# Patient Record
Sex: MALE | Race: WHITE | NOT HISPANIC OR LATINO | Employment: FULL TIME | URBAN - METROPOLITAN AREA
[De-identification: names, ages, dates, MRNs, and addresses within clinical notes are randomized per-mention and may not be internally consistent; named-entity substitution may affect disease eponyms.]

---

## 2017-06-06 ENCOUNTER — OFFICE VISIT (OUTPATIENT)
Dept: FAMILY MEDICINE CLINIC | Facility: CLINIC | Age: 57
End: 2017-06-06

## 2017-06-06 VITALS
DIASTOLIC BLOOD PRESSURE: 74 MMHG | WEIGHT: 182 LBS | HEART RATE: 71 BPM | RESPIRATION RATE: 16 BRPM | HEIGHT: 67 IN | SYSTOLIC BLOOD PRESSURE: 110 MMHG | TEMPERATURE: 98.6 F | BODY MASS INDEX: 28.56 KG/M2 | OXYGEN SATURATION: 97 %

## 2017-06-06 DIAGNOSIS — R06.83 SNORING: ICD-10-CM

## 2017-06-06 DIAGNOSIS — Z86.73 HX-TIA (TRANSIENT ISCHEMIC ATTACK): ICD-10-CM

## 2017-06-06 DIAGNOSIS — Z87.19 HX OF HEMORRHOIDS: ICD-10-CM

## 2017-06-06 DIAGNOSIS — Z00.00 LABORATORY EXAMINATION ORDERED AS PART OF A ROUTINE GENERAL MEDICAL EXAMINATION: ICD-10-CM

## 2017-06-06 DIAGNOSIS — L98.9 SKIN LESION: ICD-10-CM

## 2017-06-06 DIAGNOSIS — J20.9 ACUTE BRONCHITIS DUE TO INFECTION: ICD-10-CM

## 2017-06-06 DIAGNOSIS — J01.90 ACUTE SINUSITIS, RECURRENCE NOT SPECIFIED, UNSPECIFIED LOCATION: Primary | ICD-10-CM

## 2017-06-06 PROBLEM — Z85.038 HISTORY OF COLON CANCER: Status: ACTIVE | Noted: 2017-06-06

## 2017-06-06 PROBLEM — Z86.010 PERSONAL HISTORY OF COLONIC POLYPS: Status: ACTIVE | Noted: 2017-06-06

## 2017-06-06 PROCEDURE — 99214 OFFICE O/P EST MOD 30 MIN: CPT | Performed by: PHYSICIAN ASSISTANT

## 2017-06-06 RX ORDER — AMOXICILLIN AND CLAVULANATE POTASSIUM 875; 125 MG/1; MG/1
1 TABLET, FILM COATED ORAL 2 TIMES DAILY
Qty: 20 TABLET | Refills: 1 | Status: SHIPPED | OUTPATIENT
Start: 2017-06-06 | End: 2018-11-05

## 2017-06-06 RX ORDER — PREDNISONE 20 MG/1
20 TABLET ORAL 2 TIMES DAILY
Qty: 10 TABLET | Refills: 0 | Status: SHIPPED | OUTPATIENT
Start: 2017-06-06 | End: 2018-11-05

## 2017-06-06 RX ORDER — ASPIRIN 81 MG/1
81 TABLET ORAL DAILY
COMMUNITY

## 2017-06-06 NOTE — PROGRESS NOTES
Subjective   Ethan Almonte is a 56 y.o. male.     History of Present Illness   Ethan Almonte 56 y.o. male who presents for evaluation of sinus pressure and congestion, acute bronchitis, sore throat, cough, ear pressure, wheezing, shortness of air. Symptoms include sneezing, nasal blockage, post nasal drip, productive cough, runny nose and wheezing is worse at night.  He does smoke.  Onset of symptoms was 2 weeks ago, gradually worsening since that time. Patient denies diarrhea.   Evaluation to date: none Treatment to date:  OTC oral decongestants.     I need to update labs.  Need this fasting one month after pred  Hx TIA  Normal bilat carotid doppler 8-12-16  Interpretation Summary echo  · Left ventricular function is normal. Calculated EF = 59.2%. Estimated EF was in agreement with the calculated EF. Normal left ventricular cavity size and wall thickness noted. All left ventricular wall segments contract normally. Left ventricular diastolic dysfunction is noted (grade I) consistent with impaired relaxation.           IMPRESSION:  Normal MRI of the brain without contrast except for a small  polyp in the right maxillary sinus.      This report was finalized on 8/4/2016 10:38 PM by Dr. Jin Mancia MD.        I will refill his hemorrhoid cream;  Send it to me    Next colonoscopy Dr Phelan Jan 2018 for abn adenomatous polyps 1-2015    The following portions of the patient's history were reviewed and updated as appropriate: allergies, current medications, past family history, past medical history, past social history, past surgical history and problem list.    Review of Systems   Constitutional: Positive for diaphoresis, fatigue and fever. Negative for activity change and unexpected weight change.   HENT: Positive for congestion, postnasal drip, rhinorrhea, sinus pressure, sneezing, sore throat and voice change. Negative for ear pain.    Eyes: Negative for pain and discharge.   Respiratory: Positive for cough, shortness of  breath and wheezing. Negative for chest tightness.    Cardiovascular: Negative for chest pain and palpitations.   Gastrointestinal: Negative for abdominal pain, diarrhea and vomiting.   Endocrine: Negative.    Genitourinary: Negative.    Musculoskeletal: Negative for joint swelling.   Skin: Negative for color change, rash and wound.   Allergic/Immunologic: Negative.    Neurological: Negative for seizures and syncope.   Psychiatric/Behavioral: Negative.        Objective   Physical Exam   Constitutional: He is oriented to person, place, and time. He appears well-developed and well-nourished.  Non-toxic appearance. No distress.   HENT:   Head: Normocephalic and atraumatic. Hair is normal.   Right Ear: External ear normal. No drainage, swelling or tenderness. Tympanic membrane is retracted.   Left Ear: External ear normal. No drainage, swelling or tenderness. Tympanic membrane is retracted.   Nose: Mucosal edema present. No epistaxis.   Mouth/Throat: Uvula is midline and mucous membranes are normal. No oral lesions. No uvula swelling. Posterior oropharyngeal erythema present. No oropharyngeal exudate.   Eyes: Conjunctivae and EOM are normal. Pupils are equal, round, and reactive to light. Right eye exhibits no discharge. Left eye exhibits no discharge. No scleral icterus.   Neck: Normal range of motion. Neck supple.   Cardiovascular: Normal rate, regular rhythm and normal heart sounds.  Exam reveals no gallop.    No murmur heard.  Pulmonary/Chest: No stridor. No respiratory distress. He has wheezes. He has no rales. He exhibits no tenderness.   Abdominal: Soft. There is no tenderness.   Lymphadenopathy:     He has cervical adenopathy.   Neurological: He is alert and oriented to person, place, and time. He exhibits normal muscle tone.   Skin: Skin is warm and dry. No rash noted. He is not diaphoretic.   Pearly pea size lesion left temple and needs derm >?basal cell?   Psychiatric: He has a normal mood and affect. His  behavior is normal. Judgment and thought content normal.   Nursing note and vitals reviewed.      Assessment/Plan   Problems Addressed this Visit        Other    Hx-TIA (transient ischemic attack)    Relevant Orders    Comprehensive metabolic panel    Lipid panel    CBC and Differential    TSH    PSA    T4, Free    Vitamin D 25 Hydroxy    Ambulatory Referral to Sleep Medicine    Ambulatory Referral to Dermatology    Hx of hemorrhoids    Relevant Orders    Comprehensive metabolic panel    Lipid panel    CBC and Differential    TSH    PSA    T4, Free    Vitamin D 25 Hydroxy    Ambulatory Referral to Sleep Medicine    Ambulatory Referral to Dermatology      Other Visit Diagnoses     Acute sinusitis, recurrence not specified, unspecified location    -  Primary    Relevant Orders    Comprehensive metabolic panel    Lipid panel    CBC and Differential    TSH    PSA    T4, Free    Vitamin D 25 Hydroxy    Ambulatory Referral to Sleep Medicine    Ambulatory Referral to Dermatology    Acute bronchitis due to infection        Relevant Medications    amoxicillin-clavulanate (AUGMENTIN) 875-125 MG per tablet    Other Relevant Orders    Comprehensive metabolic panel    Lipid panel    CBC and Differential    TSH    PSA    T4, Free    Vitamin D 25 Hydroxy    Ambulatory Referral to Sleep Medicine    Ambulatory Referral to Dermatology    Laboratory examination ordered as part of a routine general medical examination        Relevant Orders    Comprehensive metabolic panel    Lipid panel    CBC and Differential    TSH    PSA    T4, Free    Vitamin D 25 Hydroxy    Ambulatory Referral to Sleep Medicine    Ambulatory Referral to Dermatology    Skin lesion        Relevant Orders    Comprehensive metabolic panel    Lipid panel    CBC and Differential    TSH    PSA    T4, Free    Vitamin D 25 Hydroxy    Ambulatory Referral to Sleep Medicine    Ambulatory Referral to Dermatology    Snoring        Relevant Orders    Comprehensive metabolic  panel    Lipid panel    CBC and Differential    TSH    PSA    T4, Free    Vitamin D 25 Hydroxy    Ambulatory Referral to Sleep Medicine    Ambulatory Referral to Dermatology

## 2017-06-06 NOTE — PATIENT INSTRUCTIONS
Low glycemic index diet  Exercise 30 minutes most days of the week  Make sure you get results on any labs or tests we ordered today  We discussed medications and how to take them as prescribed  Sleep 6-8 hours each night if possible  If you have not signed up for [employer], please activate your code ASAP from your After Visit Summary today    LDL goal <100  LDL goal if heart disease <70  HDL goal >60  Triglyceride goal <150  BP goal =<130/80  Fasting glucose <100    See derm  Sleep study  Labs one month  Stop smoking

## 2018-07-31 ENCOUNTER — PREP FOR SURGERY (OUTPATIENT)
Dept: OTHER | Facility: HOSPITAL | Age: 58
End: 2018-07-31

## 2018-07-31 DIAGNOSIS — Z86.010 HISTORY OF COLON POLYPS: Primary | ICD-10-CM

## 2018-11-07 ENCOUNTER — ANESTHESIA EVENT (OUTPATIENT)
Dept: GASTROENTEROLOGY | Facility: HOSPITAL | Age: 58
End: 2018-11-07

## 2018-11-07 ENCOUNTER — ANESTHESIA (OUTPATIENT)
Dept: GASTROENTEROLOGY | Facility: HOSPITAL | Age: 58
End: 2018-11-07

## 2018-11-07 ENCOUNTER — HOSPITAL ENCOUNTER (OUTPATIENT)
Facility: HOSPITAL | Age: 58
Setting detail: HOSPITAL OUTPATIENT SURGERY
Discharge: HOME | End: 2018-11-07
Attending: COLON & RECTAL SURGERY | Admitting: COLON & RECTAL SURGERY

## 2018-11-07 VITALS
HEART RATE: 62 BPM | BODY MASS INDEX: 26.6 KG/M2 | OXYGEN SATURATION: 96 % | HEIGHT: 67 IN | SYSTOLIC BLOOD PRESSURE: 105 MMHG | TEMPERATURE: 98.2 F | WEIGHT: 169.44 LBS | RESPIRATION RATE: 16 BRPM | DIASTOLIC BLOOD PRESSURE: 71 MMHG

## 2018-11-07 DIAGNOSIS — Z86.010 HISTORY OF COLON POLYPS: ICD-10-CM

## 2018-11-07 PROCEDURE — 88305 TISSUE EXAM BY PATHOLOGIST: CPT | Performed by: COLON & RECTAL SURGERY

## 2018-11-07 PROCEDURE — 25010000002 PROPOFOL 10 MG/ML EMULSION: Performed by: ANESTHESIOLOGY

## 2018-11-07 PROCEDURE — 45380 COLONOSCOPY AND BIOPSY: CPT | Performed by: COLON & RECTAL SURGERY

## 2018-11-07 RX ORDER — PROPOFOL 10 MG/ML
VIAL (ML) INTRAVENOUS CONTINUOUS PRN
Status: DISCONTINUED | OUTPATIENT
Start: 2018-11-07 | End: 2018-11-07 | Stop reason: SURG

## 2018-11-07 RX ORDER — SODIUM CHLORIDE, SODIUM LACTATE, POTASSIUM CHLORIDE, CALCIUM CHLORIDE 600; 310; 30; 20 MG/100ML; MG/100ML; MG/100ML; MG/100ML
1000 INJECTION, SOLUTION INTRAVENOUS CONTINUOUS
Status: DISCONTINUED | OUTPATIENT
Start: 2018-11-07 | End: 2018-11-07 | Stop reason: HOSPADM

## 2018-11-07 RX ORDER — LIDOCAINE HYDROCHLORIDE 20 MG/ML
INJECTION, SOLUTION INFILTRATION; PERINEURAL AS NEEDED
Status: DISCONTINUED | OUTPATIENT
Start: 2018-11-07 | End: 2018-11-07 | Stop reason: SURG

## 2018-11-07 RX ORDER — DOCUSATE SODIUM 100 MG/1
100 CAPSULE, LIQUID FILLED ORAL DAILY
COMMUNITY

## 2018-11-07 RX ORDER — SODIUM CHLORIDE 0.9 % (FLUSH) 0.9 %
3 SYRINGE (ML) INJECTION AS NEEDED
Status: DISCONTINUED | OUTPATIENT
Start: 2018-11-07 | End: 2018-11-07 | Stop reason: HOSPADM

## 2018-11-07 RX ORDER — PROPOFOL 10 MG/ML
VIAL (ML) INTRAVENOUS AS NEEDED
Status: DISCONTINUED | OUTPATIENT
Start: 2018-11-07 | End: 2018-11-07 | Stop reason: SURG

## 2018-11-07 RX ORDER — LIDOCAINE HYDROCHLORIDE 10 MG/ML
0.5 INJECTION, SOLUTION INFILTRATION; PERINEURAL ONCE AS NEEDED
Status: DISCONTINUED | OUTPATIENT
Start: 2018-11-07 | End: 2018-11-07 | Stop reason: HOSPADM

## 2018-11-07 RX ADMIN — SODIUM CHLORIDE, POTASSIUM CHLORIDE, SODIUM LACTATE AND CALCIUM CHLORIDE 1000 ML: 600; 310; 30; 20 INJECTION, SOLUTION INTRAVENOUS at 07:21

## 2018-11-07 RX ADMIN — SODIUM CHLORIDE, POTASSIUM CHLORIDE, SODIUM LACTATE AND CALCIUM CHLORIDE: 600; 310; 30; 20 INJECTION, SOLUTION INTRAVENOUS at 07:27

## 2018-11-07 RX ADMIN — PROPOFOL 140 MCG/KG/MIN: 10 INJECTION, EMULSION INTRAVENOUS at 07:29

## 2018-11-07 RX ADMIN — LIDOCAINE HYDROCHLORIDE 40 MG: 20 INJECTION, SOLUTION INFILTRATION; PERINEURAL at 07:27

## 2018-11-07 RX ADMIN — PROPOFOL 50 MG: 10 INJECTION, EMULSION INTRAVENOUS at 07:29

## 2018-11-07 NOTE — ANESTHESIA POSTPROCEDURE EVALUATION
"Patient: Ethan Almonte    Procedure Summary     Date:  11/07/18 Room / Location:  [facility] ENDOSCOPY 9 /  [facility]    Anesthesia Start:  0725 Anesthesia Stop:  0747    Procedure:  COLONOSCOPY INTO CECUM WITH BIOPSIES AND POLYPECTOMY X 2 (N/A ) Diagnosis:       History of colon polyps      (History of colon polyps [Z86.010])    Surgeon:  Spencer Phelan MD Provider:  Jameel Castanon MD    Anesthesia Type:  MAC ASA Status:  3          Anesthesia Type: MAC  Last vitals  BP   115/73 (11/07/18 0710)   Temp   36.8 °C (98.2 °F) (11/07/18 0710)   Pulse   64 (11/07/18 0710)   Resp   16 (11/07/18 0710)     SpO2   96 % (11/07/18 0710)     Post Anesthesia Care and Evaluation    Patient location during evaluation: bedside  Patient participation: complete - patient participated  Level of consciousness: awake  Pain score: 2  Pain management: adequate  Airway patency: patent  Anesthetic complications: No anesthetic complications  PONV Status: none  Cardiovascular status: acceptable  Respiratory status: acceptable  Hydration status: acceptable    Comments: /73 (BP Location: Left arm, Patient Position: Sitting)   Pulse 64   Temp 36.8 °C (98.2 °F) (Oral)   Resp 16   Ht 170.2 cm (67\")   Wt 76.9 kg (169 lb 7 oz)   SpO2 96%   BMI 26.54 kg/m²         "

## 2018-11-07 NOTE — ANESTHESIA PREPROCEDURE EVALUATION
Anesthesia Evaluation     Patient summary reviewed and Nursing notes reviewed   NPO Solid Status: > 8 hours  NPO Liquid Status: > 2 hours           Airway   Mallampati: II  TM distance: >3 FB  Neck ROM: full  Dental - normal exam     Pulmonary - normal exam   (+) pneumonia resolved , a smoker Current Smoked day of surgery,   Cardiovascular - negative cardio ROS and normal exam        Neuro/Psych  (+) CVA,     GI/Hepatic/Renal/Endo - negative ROS     Musculoskeletal (-) negative ROS    Abdominal    Substance History - negative use     OB/GYN negative ob/gyn ROS         Other                        Anesthesia Plan    ASA 3     MAC     Anesthetic plan, all risks, benefits, and alternatives have been provided, discussed and informed consent has been obtained with: patient.

## 2018-11-07 NOTE — H&P
Ethan Almotne is a 57 y.o. male  who is referred by Jakub Phelan MD for a colonoscopy. He is an  has a history of previous adenomatous polyp.     He denies any change in bowel function, melena, or hematochezia.    Past Medical History:   Diagnosis Date   • Actinic keratosis    • Acute right eye pain 08/04/2016    WITH VISUAL DISTURBANCE, SEEN AT [facility] ER   • BPH (benign prostatic hyperplasia)    • Colon polyps    • Hemorrhoids    • Mass of anus 10/15/2013    PATH: FIBRO-EPITHELIAL POLYP WITH CONDYLOMATOUS CHANGE, HPV TESTING NEGATIVE   • Pneumonia    • Postoperative urinary retention 03/06/2006    ADMITTED TO [facility]   • Seborrheic keratosis    • Snoring    • Stroke (CMS/HCC)     TIA       Past Surgical History:   Procedure Laterality Date   • ANUS SURGERY N/A 10/15/2013    EXCISION OF ANAL MASS, DR. JAKUB PHELAN AT [facility]   • COLONOSCOPY N/A 05/14/2014    5 MM SESSILE HYPERPLASTIC POLYP IN TRANSVERSE, 7 MM SESSILE ADENOMATOUS POLYP IN DESCENDING, 12 MM SESSILE ADENOMATOUS POLYP IN SIGMOID, 4 MM SESSILE HYPERPLASTIC POLYP IN RECTUM,RESCOPE IN 6 MONTH, DR. JAKUB PHELAN AT [facility]   • COLONOSCOPY N/A 01/14/2015    TATTOO SEEN IN PROXIMAL SIGMOID COLON, RESCOPE IN 3 YRS, DR. JAKUB PHELAN AT [facility]   • CRYOTHERAPY N/A 07/28/2017    RIGHT HAND, BILATERAL FOREHEAD, LEFT CHEEK, [facility] PASCUAL PA-C   • HEMORRHOIDECTOMY N/A 03/02/2006    INTERNAL HEMORRHOIDS WITH THROMBOSIS AND PROLAPSE, DR. JULIAN CAMARGO AT [facility]   • SKIN BIOPSY Left 07/28/2017    LEFT TEMPLE LESION, [facility] PASCUAL PA-C   • STRABISMUS SURGERY         Prescriptions Prior to Admission   Medication Sig Dispense Refill Last Dose   • docusate sodium (COLACE) 100 MG capsule Take 100 mg by mouth Daily.   10/31/2018   • aspirin 81 MG EC tablet Take 81 mg by mouth Daily.   11/3/2018       No Known Allergies    History reviewed. No pertinent family history.    Social History     Social History   • Marital status:      Spouse name: N/A   • Number of children: N/A   • Years of  education: N/A     Occupational History   • Not on file.     Social History Main Topics   • Smoking status: Current Every Day Smoker     Packs/day: 1.00     Types: Cigarettes   • Smokeless tobacco: Never Used   • Alcohol use Yes      Comment: RARELY   • Drug use: No   • Sexual activity: Defer     Other Topics Concern   • Not on file     Social History Narrative   • No narrative on file       Review of Systems   Gastrointestinal: Negative for abdominal pain, nausea and vomiting.   All other systems reviewed and are negative.      Vitals:    11/07/18 0710   BP: 115/73   Pulse: 64   Resp: 16   Temp: 98.2 °F (36.8 °C)   SpO2: 96%         Physical Exam   Constitutional: He is oriented to person, place, and time. He appears well-developed and well-nourished.   HENT:   Head: Normocephalic and atraumatic.   Eyes: Pupils are equal, round, and reactive to light. EOM are normal.   Cardiovascular: Regular rhythm.    Pulmonary/Chest: Effort normal.   Abdominal: Soft. He exhibits no distension.   Musculoskeletal: Normal range of motion.   Neurological: He is alert and oriented to person, place, and time.   Skin: Skin is warm and dry.   Psychiatric: Judgment and thought content normal.         Assessment/Plan      previous adenomatous polyp.         I recommend colonoscopy.  I described risks, benefits of the procedure with the patient including but not limited to bleeding, infection, possibility of perforation and possible polypectomy. All of the patient's questions were answered and they would like to proceed with the above recommendations.

## 2018-11-08 LAB
CYTO UR: NORMAL
LAB AP CASE REPORT: NORMAL
PATH REPORT.FINAL DX SPEC: NORMAL
PATH REPORT.GROSS SPEC: NORMAL

## 2019-01-09 ENCOUNTER — OFFICE VISIT (OUTPATIENT)
Dept: FAMILY MEDICINE CLINIC | Facility: CLINIC | Age: 59
End: 2019-01-09

## 2019-01-09 VITALS
DIASTOLIC BLOOD PRESSURE: 68 MMHG | TEMPERATURE: 97.8 F | OXYGEN SATURATION: 97 % | RESPIRATION RATE: 16 BRPM | WEIGHT: 175 LBS | HEIGHT: 67 IN | SYSTOLIC BLOOD PRESSURE: 114 MMHG | BODY MASS INDEX: 27.47 KG/M2 | HEART RATE: 62 BPM

## 2019-01-09 DIAGNOSIS — Z12.5 SCREENING PSA (PROSTATE SPECIFIC ANTIGEN): ICD-10-CM

## 2019-01-09 DIAGNOSIS — Z87.81 HISTORY OF WRIST FRACTURE: ICD-10-CM

## 2019-01-09 DIAGNOSIS — Z00.00 LABORATORY EXAMINATION ORDERED AS PART OF A ROUTINE GENERAL MEDICAL EXAMINATION: ICD-10-CM

## 2019-01-09 DIAGNOSIS — Z86.73 HX-TIA (TRANSIENT ISCHEMIC ATTACK): Primary | ICD-10-CM

## 2019-01-09 PROCEDURE — 99396 PREV VISIT EST AGE 40-64: CPT | Performed by: PHYSICIAN ASSISTANT

## 2019-01-09 NOTE — PROGRESS NOTES
Subjective   Ethan Almonte is a 58 y.o. male.     History of Present Illness       Notes from last visit  June 2017!!!!!!    Hx TIA  Normal bilat carotid doppler 8-12-16  Interpretation Summary echo  · Left ventricular function is normal. Calculated EF = 59.2%. Estimated EF was in agreement with the calculated EF. Normal left ventricular cavity size and wall thickness noted. All left ventricular wall segments contract normally. Left ventricular diastolic dysfunction is noted (grade I) consistent with impaired relaxation.             IMPRESSION:  Normal MRI of the brain without contrast except for a small  polyp in the right maxillary sinus.      This report was finalized on 8/4/2016 10:38 PM by Dr. Jin Mancia MD.         Next colonoscopy Dr Phelan ---do again 6mos    Right wrist prior fx; still weak in this wrist and needs to have note to use crossbow for [employer] form  He is right handed and can't pull bow string back and he deer hunts    The following portions of the patient's history were reviewed and updated as appropriate: allergies, current medications, past family history, past medical history, past social history, past surgical history and problem list.    Review of Systems   Constitutional: Negative for activity change, appetite change and unexpected weight change.   HENT: Negative for nosebleeds and trouble swallowing.    Eyes: Negative for pain and visual disturbance.   Respiratory: Negative for chest tightness, shortness of breath and wheezing.    Cardiovascular: Negative for chest pain and palpitations.   Gastrointestinal: Negative for abdominal pain and blood in stool.   Endocrine: Negative.    Genitourinary: Negative for difficulty urinating and hematuria.   Musculoskeletal: Negative for joint swelling.   Skin: Negative for color change and rash.   Allergic/Immunologic: Negative.    Neurological: Negative for syncope and speech difficulty.   Hematological: Negative for adenopathy.    Psychiatric/Behavioral: Negative for agitation and confusion.   All other systems reviewed and are negative.      Objective   Physical Exam   Constitutional: He is oriented to person, place, and time. He appears well-developed and well-nourished.  Non-toxic appearance. No distress.   HENT:   Head: Normocephalic and atraumatic. Hair is normal.   Right Ear: External ear normal. No drainage, swelling or tenderness. Tympanic membrane is retracted.   Left Ear: External ear normal. No drainage, swelling or tenderness. Tympanic membrane is retracted.   Nose: Mucosal edema present. No epistaxis.   Mouth/Throat: Uvula is midline and mucous membranes are normal. No oral lesions. No uvula swelling. Posterior oropharyngeal erythema present. No oropharyngeal exudate.   Eyes: Conjunctivae and EOM are normal. Pupils are equal, round, and reactive to light. Right eye exhibits no discharge. Left eye exhibits no discharge. No scleral icterus.   Neck: Normal range of motion. Neck supple.   Cardiovascular: Normal rate, regular rhythm and normal heart sounds. Exam reveals no gallop.   No murmur heard.  Pulmonary/Chest: No stridor. No respiratory distress. He has no wheezes. He has no rales. He exhibits no tenderness.   Abdominal: Soft. There is no tenderness.   Musculoskeletal:   He is sore with right wrist ROM and reduced ROM; small ganglion cyst dorsal wrist--declines hand doc referral   Lymphadenopathy:     He has no cervical adenopathy.   Neurological: He is alert and oriented to person, place, and time. He exhibits normal muscle tone.   Skin: Skin is warm and dry. No rash noted. He is not diaphoretic.   Pearly pea size lesion left temple and needs derm >?basal cell?   Psychiatric: He has a normal mood and affect. His behavior is normal. Judgment and thought content normal.   Nursing note and vitals reviewed.      Assessment/Plan   Ethan was seen today for forms.    Diagnoses and all orders for this visit:    Hx-TIA (transient  ischemic attack)  -     Comprehensive metabolic panel  -     Lipid panel  -     CBC and Differential  -     TSH  -     Vitamin D 25 Hydroxy  -     Vitamin B12  -     Folate  -     Urinalysis With Microscopic - Urine, Clean Catch  -     PSA Screen  -     T3, Free  -     T4, Free    Screening PSA (prostate specific antigen)  -     Comprehensive metabolic panel  -     Lipid panel  -     CBC and Differential  -     TSH  -     Vitamin D 25 Hydroxy  -     Vitamin B12  -     Folate  -     Urinalysis With Microscopic - Urine, Clean Catch  -     PSA Screen  -     T3, Free  -     T4, Free    Laboratory examination ordered as part of a routine general medical examination  -     Comprehensive metabolic panel  -     Lipid panel  -     CBC and Differential  -     TSH  -     Vitamin D 25 Hydroxy  -     Vitamin B12  -     Folate  -     Urinalysis With Microscopic - Urine, Clean Catch  -     PSA Screen  -     T3, Free  -     T4, Free    History of wrist fracture

## 2019-01-10 DIAGNOSIS — R71.8 ELEVATED MCV: ICD-10-CM

## 2019-01-10 DIAGNOSIS — R73.01 IMPAIRED FASTING GLUCOSE: ICD-10-CM

## 2019-01-10 DIAGNOSIS — E78.2 MIXED HYPERLIPIDEMIA: Primary | ICD-10-CM

## 2019-01-10 LAB
25(OH)D3+25(OH)D2 SERPL-MCNC: 33.4 NG/ML (ref 30–100)
ALBUMIN SERPL-MCNC: 4.4 G/DL (ref 3.5–5.2)
ALBUMIN/GLOB SERPL: 1.6 G/DL
ALP SERPL-CCNC: 58 U/L (ref 39–117)
ALT SERPL-CCNC: 17 U/L (ref 1–41)
APPEARANCE UR: CLEAR
AST SERPL-CCNC: 14 U/L (ref 1–40)
BACTERIA #/AREA URNS HPF: NORMAL /HPF
BASOPHILS # BLD AUTO: 0.02 10*3/MM3 (ref 0–0.2)
BASOPHILS NFR BLD AUTO: 0.3 % (ref 0–1.5)
BILIRUB SERPL-MCNC: 0.2 MG/DL (ref 0.1–1.2)
BILIRUB UR QL STRIP: NEGATIVE
BUN SERPL-MCNC: 10 MG/DL (ref 6–20)
BUN/CREAT SERPL: 8 (ref 7–25)
CALCIUM SERPL-MCNC: 9.5 MG/DL (ref 8.6–10.5)
CASTS URNS MICRO: NORMAL
CHLORIDE SERPL-SCNC: 103 MMOL/L (ref 98–107)
CHOLEST SERPL-MCNC: 163 MG/DL (ref 0–200)
CO2 SERPL-SCNC: 24.4 MMOL/L (ref 22–29)
COLOR UR: YELLOW
CREAT SERPL-MCNC: 1.25 MG/DL (ref 0.76–1.27)
EOSINOPHIL # BLD AUTO: 0.17 10*3/MM3 (ref 0–0.7)
EOSINOPHIL NFR BLD AUTO: 2.6 % (ref 0.3–6.2)
EPI CELLS #/AREA URNS HPF: NORMAL /HPF
ERYTHROCYTE [DISTWIDTH] IN BLOOD BY AUTOMATED COUNT: 13.3 % (ref 11.5–14.5)
FOLATE SERPL-MCNC: 8.31 NG/ML (ref 4.78–24.2)
GLOBULIN SER CALC-MCNC: 2.7 GM/DL
GLUCOSE SERPL-MCNC: 104 MG/DL (ref 65–99)
GLUCOSE UR QL: NEGATIVE
HCT VFR BLD AUTO: 48.8 % (ref 40.4–52.2)
HDLC SERPL-MCNC: 35 MG/DL (ref 40–60)
HGB BLD-MCNC: 15.8 G/DL (ref 13.7–17.6)
HGB UR QL STRIP: NEGATIVE
IMM GRANULOCYTES # BLD AUTO: 0.02 10*3/MM3 (ref 0–0.03)
IMM GRANULOCYTES NFR BLD AUTO: 0.3 % (ref 0–0.5)
KETONES UR QL STRIP: NEGATIVE
LDLC SERPL CALC-MCNC: 102 MG/DL (ref 0–100)
LEUKOCYTE ESTERASE UR QL STRIP: NEGATIVE
LYMPHOCYTES # BLD AUTO: 2.15 10*3/MM3 (ref 0.9–4.8)
LYMPHOCYTES NFR BLD AUTO: 33.4 % (ref 19.6–45.3)
MCH RBC QN AUTO: 33.1 PG (ref 27–32.7)
MCHC RBC AUTO-ENTMCNC: 32.4 G/DL (ref 32.6–36.4)
MCV RBC AUTO: 102.1 FL (ref 79.8–96.2)
MONOCYTES # BLD AUTO: 0.53 10*3/MM3 (ref 0.2–1.2)
MONOCYTES NFR BLD AUTO: 8.2 % (ref 5–12)
NEUTROPHILS # BLD AUTO: 3.54 10*3/MM3 (ref 1.9–8.1)
NEUTROPHILS NFR BLD AUTO: 55.2 % (ref 42.7–76)
NITRITE UR QL STRIP: NEGATIVE
PH UR STRIP: 6 [PH] (ref 5–8)
PLATELET # BLD AUTO: 228 10*3/MM3 (ref 140–500)
POTASSIUM SERPL-SCNC: 4.7 MMOL/L (ref 3.5–5.2)
PROT SERPL-MCNC: 7.1 G/DL (ref 6–8.5)
PROT UR QL STRIP: NEGATIVE
PSA SERPL-MCNC: 0.61 NG/ML (ref 0–4)
RBC # BLD AUTO: 4.78 10*6/MM3 (ref 4.6–6)
RBC #/AREA URNS HPF: NORMAL /HPF
SODIUM SERPL-SCNC: 141 MMOL/L (ref 136–145)
SP GR UR: 1.01 (ref 1–1.03)
T3FREE SERPL-MCNC: 3 PG/ML (ref 2–4.4)
T4 FREE SERPL-MCNC: 1.21 NG/DL (ref 0.93–1.7)
TRIGL SERPL-MCNC: 129 MG/DL (ref 0–150)
TSH SERPL DL<=0.005 MIU/L-ACNC: 1.88 MIU/ML (ref 0.27–4.2)
UROBILINOGEN UR STRIP-MCNC: (no result) MG/DL
VIT B12 SERPL-MCNC: 364 PG/ML (ref 211–946)
VLDLC SERPL CALC-MCNC: 25.8 MG/DL (ref 5–40)
WBC # BLD AUTO: 6.43 10*3/MM3 (ref 4.5–10.7)
WBC #/AREA URNS HPF: NORMAL /HPF

## 2019-01-11 LAB
HBA1C MFR BLD: 5.52 % (ref 4.8–5.6)
Lab: NORMAL
WRITTEN AUTHORIZATION: NORMAL

## 2019-01-14 RX ORDER — ATORVASTATIN CALCIUM 20 MG/1
20 TABLET, FILM COATED ORAL DAILY
Qty: 30 TABLET | Refills: 11 | Status: SHIPPED | OUTPATIENT
Start: 2019-01-14

## 2019-01-14 NOTE — PROGRESS NOTES
Pt states he will start the medication for cholesterol but you did not send it over. Will get his labs checked in 3 months

## 2019-02-21 ENCOUNTER — RESULTS ENCOUNTER (OUTPATIENT)
Dept: FAMILY MEDICINE CLINIC | Facility: CLINIC | Age: 59
End: 2019-02-21

## 2019-02-21 DIAGNOSIS — R71.8 ELEVATED MCV: ICD-10-CM

## 2019-02-21 DIAGNOSIS — E78.2 MIXED HYPERLIPIDEMIA: ICD-10-CM

## 2019-02-21 DIAGNOSIS — R73.01 IMPAIRED FASTING GLUCOSE: ICD-10-CM

## 2019-05-04 ENCOUNTER — HOSPITAL ENCOUNTER (EMERGENCY)
Facility: HOSPITAL | Age: 59
Discharge: HOME | End: 2019-05-04
Attending: EMERGENCY MEDICINE | Admitting: EMERGENCY MEDICINE

## 2019-05-04 ENCOUNTER — APPOINTMENT (OUTPATIENT)
Dept: GENERAL RADIOLOGY | Facility: HOSPITAL | Age: 59
End: 2019-05-04

## 2019-05-04 VITALS
SYSTOLIC BLOOD PRESSURE: 102 MMHG | HEART RATE: 73 BPM | BODY MASS INDEX: 27.47 KG/M2 | HEIGHT: 67 IN | OXYGEN SATURATION: 96 % | WEIGHT: 175 LBS | TEMPERATURE: 97.6 F | RESPIRATION RATE: 18 BRPM | DIASTOLIC BLOOD PRESSURE: 54 MMHG

## 2019-05-04 DIAGNOSIS — S22.31XA CLOSED FRACTURE OF ONE RIB OF RIGHT SIDE, INITIAL ENCOUNTER: Primary | ICD-10-CM

## 2019-05-04 LAB
ALBUMIN SERPL-MCNC: 4.2 G/DL (ref 3.5–5.2)
ALBUMIN/GLOB SERPL: 1.2 G/DL
ALP SERPL-CCNC: 75 U/L (ref 39–117)
ALT SERPL W P-5'-P-CCNC: 21 U/L (ref 1–41)
ANION GAP SERPL CALCULATED.3IONS-SCNC: 12.5 MMOL/L
AST SERPL-CCNC: 17 U/L (ref 1–40)
BASOPHILS # BLD AUTO: 0.03 10*3/MM3 (ref 0–0.2)
BASOPHILS NFR BLD AUTO: 0.3 % (ref 0–1.5)
BILIRUB SERPL-MCNC: 0.7 MG/DL (ref 0.2–1.2)
BUN BLD-MCNC: 14 MG/DL (ref 6–20)
BUN/CREAT SERPL: 11.7 (ref 7–25)
CALCIUM SPEC-SCNC: 8.9 MG/DL (ref 8.6–10.5)
CHLORIDE SERPL-SCNC: 96 MMOL/L (ref 98–107)
CO2 SERPL-SCNC: 23.5 MMOL/L (ref 22–29)
CREAT BLD-MCNC: 1.2 MG/DL (ref 0.76–1.27)
DEPRECATED RDW RBC AUTO: 46.5 FL (ref 37–54)
EOSINOPHIL # BLD AUTO: 0.23 10*3/MM3 (ref 0–0.4)
EOSINOPHIL NFR BLD AUTO: 2 % (ref 0.3–6.2)
ERYTHROCYTE [DISTWIDTH] IN BLOOD BY AUTOMATED COUNT: 12.9 % (ref 12.3–15.4)
GFR SERPL CREATININE-BSD FRML MDRD: 63 ML/MIN/1.73
GLOBULIN UR ELPH-MCNC: 3.6 GM/DL
GLUCOSE BLD-MCNC: 103 MG/DL (ref 65–99)
HCT VFR BLD AUTO: 48 % (ref 37.5–51)
HGB BLD-MCNC: 16.2 G/DL (ref 13–17.7)
HOLD SPECIMEN: NORMAL
HOLD SPECIMEN: NORMAL
IMM GRANULOCYTES # BLD AUTO: 0.04 10*3/MM3 (ref 0–0.05)
IMM GRANULOCYTES NFR BLD AUTO: 0.3 % (ref 0–0.5)
LYMPHOCYTES # BLD AUTO: 1.62 10*3/MM3 (ref 0.7–3.1)
LYMPHOCYTES NFR BLD AUTO: 13.8 % (ref 19.6–45.3)
MCH RBC QN AUTO: 32.9 PG (ref 26.6–33)
MCHC RBC AUTO-ENTMCNC: 33.8 G/DL (ref 31.5–35.7)
MCV RBC AUTO: 97.4 FL (ref 79–97)
MONOCYTES # BLD AUTO: 1.05 10*3/MM3 (ref 0.1–0.9)
MONOCYTES NFR BLD AUTO: 9 % (ref 5–12)
NEUTROPHILS # BLD AUTO: 8.74 10*3/MM3 (ref 1.7–7)
NEUTROPHILS NFR BLD AUTO: 74.6 % (ref 42.7–76)
NRBC BLD AUTO-RTO: 0 /100 WBC (ref 0–0.2)
PLATELET # BLD AUTO: 316 10*3/MM3 (ref 140–450)
PMV BLD AUTO: 10.4 FL (ref 6–12)
POTASSIUM BLD-SCNC: 4.2 MMOL/L (ref 3.5–5.2)
PROT SERPL-MCNC: 7.8 G/DL (ref 6–8.5)
RBC # BLD AUTO: 4.93 10*6/MM3 (ref 4.14–5.8)
SODIUM BLD-SCNC: 132 MMOL/L (ref 136–145)
TROPONIN T SERPL-MCNC: <0.01 NG/ML (ref 0–0.03)
WBC NRBC COR # BLD: 11.71 10*3/MM3 (ref 3.4–10.8)
WHOLE BLOOD HOLD SPECIMEN: NORMAL
WHOLE BLOOD HOLD SPECIMEN: NORMAL

## 2019-05-04 PROCEDURE — 93010 ELECTROCARDIOGRAM REPORT: CPT | Performed by: INTERNAL MEDICINE

## 2019-05-04 PROCEDURE — 93005 ELECTROCARDIOGRAM TRACING: CPT | Performed by: NURSE PRACTITIONER

## 2019-05-04 PROCEDURE — 99284 EMERGENCY DEPT VISIT MOD MDM: CPT

## 2019-05-04 PROCEDURE — 71101 X-RAY EXAM UNILAT RIBS/CHEST: CPT

## 2019-05-04 PROCEDURE — 84484 ASSAY OF TROPONIN QUANT: CPT | Performed by: NURSE PRACTITIONER

## 2019-05-04 PROCEDURE — 80053 COMPREHEN METABOLIC PANEL: CPT | Performed by: NURSE PRACTITIONER

## 2019-05-04 PROCEDURE — 85025 COMPLETE CBC W/AUTO DIFF WBC: CPT | Performed by: NURSE PRACTITIONER

## 2019-05-04 RX ORDER — HYDROCODONE BITARTRATE AND ACETAMINOPHEN 5; 325 MG/1; MG/1
1 TABLET ORAL EVERY 4 HOURS PRN
Qty: 12 TABLET | Refills: 0 | Status: SHIPPED | OUTPATIENT
Start: 2019-05-04

## 2019-05-04 RX ORDER — HYDROCODONE BITARTRATE AND ACETAMINOPHEN 7.5; 325 MG/1; MG/1
1 TABLET ORAL ONCE
Status: COMPLETED | OUTPATIENT
Start: 2019-05-04 | End: 2019-05-04

## 2019-05-04 RX ORDER — LIDOCAINE 50 MG/G
1 PATCH TOPICAL EVERY 24 HOURS
Qty: 30 EACH | Refills: 0 | Status: SHIPPED | OUTPATIENT
Start: 2019-05-04

## 2019-05-04 RX ADMIN — HYDROCODONE BITARTRATE AND ACETAMINOPHEN 1 TABLET: 7.5; 325 TABLET ORAL at 12:56

## 2019-05-04 NOTE — ED PROVIDER NOTES
MD ATTESTATION NOTE    The LEANNE and I have discussed this patient's history, physical exam, and treatment plan.  I have reviewed the documentation and personally had a face to face interaction with the patient. I affirm the documentation and agree with the treatment and plan.  The attached note describes my personal findings.      History  58-year-old with atypical chest pain going on for several days.  The pain is right-sided and worse with movement or palpation, very atypical for cardiac.  Wife is concerned for possible gallbladder disease.  I did discuss case and evaluation with DEION Dee    Physical Exam  Alert, no apparent distress  Heart regular rate and rhythm  Is clear to auscultation bilaterally  Chest there is point tenderness to palpation in the lower right chest wall    EKG          EKG time: 1307  Rhythm/Rate: Sinus 78  P waves and IN: Normal P waves and IN intervals  QRS, axis: Normal axis normal QRS  ST and T waves: Normal ST and T wave    Interpreted Contemporaneously by me, independently viewed  No prior to compare      Disposition  History and exam is very atypical for cardiac, but we will go ahead and check x-ray and labs to rule out other pathologies including lung chest wall and abdominal pathologies.       Jin Ramírez MD  05/04/19 1309       Jin Ramírez MD  05/04/19 [id number]

## 2019-05-04 NOTE — ED PROVIDER NOTES
"  EMERGENCY DEPARTMENT ENCOUNTER    CHIEF COMPLAINT  Chief Complaint: Right rib pain  History given by:Pt  History limited by:None  Time Seen: 12:40 PM   Room Number: 31/31  PMD: Adrienne Preston PA-C      HPI:  Pt is a 58 y.o. male who presents with worsening intermittent sharp right sided rib pain that began 2 days ago. Pt states the pain is aggravated by movement and deep breath and alleviated by holding pressure on his ribs.  Patient also complains of an episode of indigestion pain that kept him awake 3 nights ago. Pt states he is \"95% sure it was heartburn pain\". Patient denies fever, cardiac CP, SOA, abd pain, and N/V/D. Pt denies past cardiac hx. He also denies h/o DM, HTN, and lung disease. Pt has taken Advil for pain PTA.   Past Medical History of HLD and TIA.    Duration: 3 days  Timing:intermittent   Location:rigth ribs  Quality:sharp  Intensity/Severity:moderate  Progression:worsening  Associated Symptoms:indigestion   Aggravating Factors:movement, deep breath  Alleviating Factors:holding pressure on ribs  Previous Episodes:none  Treatment before arrival:Pt has taken Advil for pain PTA.       PAST MEDICAL HISTORY  Active Ambulatory Problems     Diagnosis Date Noted   • Hx-TIA (transient ischemic attack) 08/12/2016   • Hx of hemorrhoids 06/06/2017   • Personal history of colonic polyps 06/06/2017   • History of colon polyps 07/31/2018   • History of wrist fracture 01/09/2019   • Mixed hyperlipidemia 01/10/2019   • Impaired fasting glucose 01/10/2019   • Elevated MCV 01/10/2019     Resolved Ambulatory Problems     Diagnosis Date Noted   • No Resolved Ambulatory Problems     Past Medical History:   Diagnosis Date   • Actinic keratosis    • Acute right eye pain 08/04/2016   • BPH (benign prostatic hyperplasia)    • Colon polyps    • Hemorrhoids    • Mass of anus 10/15/2013   • Pneumonia    • Postoperative urinary retention 03/06/2006   • Seborrheic keratosis    • Snoring    • Stroke (CMS/HCC)        PAST " SURGICAL HISTORY  Past Surgical History:   Procedure Laterality Date   • ANUS SURGERY N/A 10/15/2013    EXCISION OF ANAL MASS, DR. JAKUB LEVI AT [facility]   • COLONOSCOPY N/A 05/14/2014    5 MM SESSILE HYPERPLASTIC POLYP IN TRANSVERSE, 7 MM SESSILE ADENOMATOUS POLYP IN DESCENDING, 12 MM SESSILE ADENOMATOUS POLYP IN SIGMOID, 4 MM SESSILE HYPERPLASTIC POLYP IN RECTUM,RESCOPE IN 6 MONTH, DR. JAKUB LEVI AT [facility]   • COLONOSCOPY N/A 01/14/2015    TATTOO SEEN IN PROXIMAL SIGMOID COLON, RESCOPE IN 3 YRS, DR. JAKUB LEVI AT [facility]   • COLONOSCOPY N/A 11/7/2018    3 HYPERPLASTIC POLYPS, RESCOPE IN 5 YRS, DR. JAKUB LEVI AT [facility]   • CRYOTHERAPY N/A 07/28/2017    RIGHT HAND, BILATERAL FOREHEAD, LEFT CHEEK, BRENTON PASCUAL PA-C   • HEMORRHOIDECTOMY N/A 03/02/2006    INTERNAL HEMORRHOIDS WITH THROMBOSIS AND PROLAPSE, DR. JULIAN CAMARGO AT [facility]   • SKIN BIOPSY Left 07/28/2017    LEFT TEMPLE LESION, BRENTON PASCUAL PA-C   • STRABISMUS SURGERY         FAMILY HISTORY  History reviewed. No pertinent family history.    SOCIAL HISTORY  Social History     Socioeconomic History   • Marital status:      Spouse name: Not on file   • Number of children: Not on file   • Years of education: Not on file   • Highest education level: Not on file   Tobacco Use   • Smoking status: Current Every Day Smoker     Packs/day: 1.00     Types: Cigarettes   • Smokeless tobacco: Never Used   Substance and Sexual Activity   • Alcohol use: Yes     Comment: RARELY   • Drug use: No   • Sexual activity: Defer         ALLERGIES  Patient has no known allergies.    REVIEW OF SYSTEMS  Review of Systems   Constitutional: Negative for chills and fever.   HENT: Negative for sore throat.    Respiratory: Negative for shortness of breath.    Cardiovascular: Negative for chest pain.   Gastrointestinal: Negative for abdominal pain, diarrhea, nausea and vomiting.        Indigestion (+)   Genitourinary: Negative for dysuria.   Musculoskeletal: Negative for back pain.        Right  sided rib pain (+)   Skin: Negative for rash.   Neurological: Negative for dizziness.   Psychiatric/Behavioral: The patient is not nervous/anxious.        PHYSICAL EXAM  ED Triage Vitals   Temp Heart Rate Resp BP SpO2   05/04/19 1153 05/04/19 1153 05/04/19 1212 05/04/19 1208 05/04/19 1153   97.6 °F (36.4 °C) 104 18 108/71 96 %         Physical Exam   Constitutional: He is well-developed, well-nourished, and in no distress. No distress.   HENT:   Head: Atraumatic.   Mouth/Throat: Mucous membranes are normal.   Eyes: No scleral icterus.   Neck: Normal range of motion.   Cardiovascular: Normal rate, regular rhythm and normal heart sounds.   Pulmonary/Chest: Effort normal and breath sounds normal.   reproducible right lateral chest wall pain   Abdominal: Soft. There is no tenderness.   Musculoskeletal: Normal range of motion.   Neurological: He is alert.   Skin: Skin is warm and dry.   Psychiatric: Mood and affect normal.   Nursing note and vitals reviewed.      LAB RESULTS  Recent Results (from the past 24 hour(s))   Light Blue Top    Collection Time: 05/04/19 12:20 PM   Result Value Ref Range    Extra Tube hold for add-on    Green Top (Gel)    Collection Time: 05/04/19 12:20 PM   Result Value Ref Range    Extra Tube Hold for add-ons.    Lavender Top    Collection Time: 05/04/19 12:20 PM   Result Value Ref Range    Extra Tube hold for add-on    Gold Top - SST    Collection Time: 05/04/19 12:20 PM   Result Value Ref Range    Extra Tube Hold for add-ons.    Comprehensive Metabolic Panel    Collection Time: 05/04/19 12:20 PM   Result Value Ref Range    Glucose 103 (H) 65 - 99 mg/dL    BUN 14 6 - 20 mg/dL    Creatinine 1.20 0.76 - 1.27 mg/dL    Sodium 132 (L) 136 - 145 mmol/L    Potassium 4.2 3.5 - 5.2 mmol/L    Chloride 96 (L) 98 - 107 mmol/L    CO2 23.5 22.0 - 29.0 mmol/L    Calcium 8.9 8.6 - 10.5 mg/dL    Total Protein 7.8 6.0 - 8.5 g/dL    Albumin 4.20 3.50 - 5.20 g/dL    ALT (SGPT) 21 1 - 41 U/L    AST (SGOT) 17 1 -  40 U/L    Alkaline Phosphatase 75 39 - 117 U/L    Total Bilirubin 0.7 0.2 - 1.2 mg/dL    eGFR Non African Amer 63 >60 mL/min/1.73    Globulin 3.6 gm/dL    A/G Ratio 1.2 g/dL    BUN/Creatinine Ratio 11.7 7.0 - 25.0    Anion Gap 12.5 mmol/L   Troponin    Collection Time: 05/04/19 12:20 PM   Result Value Ref Range    Troponin T <0.010 0.000 - 0.030 ng/mL   CBC Auto Differential    Collection Time: 05/04/19 12:20 PM   Result Value Ref Range    WBC 11.71 (H) 3.40 - 10.80 10*3/mm3    RBC 4.93 4.14 - 5.80 10*6/mm3    Hemoglobin 16.2 13.0 - 17.7 g/dL    Hematocrit 48.0 37.5 - 51.0 %    MCV 97.4 (H) 79.0 - 97.0 fL    MCH 32.9 26.6 - 33.0 pg    MCHC 33.8 31.5 - 35.7 g/dL    RDW 12.9 12.3 - 15.4 %    RDW-SD 46.5 37.0 - 54.0 fl    MPV 10.4 6.0 - 12.0 fL    Platelets 316 140 - 450 10*3/mm3    Neutrophil % 74.6 42.7 - 76.0 %    Lymphocyte % 13.8 (L) 19.6 - 45.3 %    Monocyte % 9.0 5.0 - 12.0 %    Eosinophil % 2.0 0.3 - 6.2 %    Basophil % 0.3 0.0 - 1.5 %    Immature Grans % 0.3 0.0 - 0.5 %    Neutrophils, Absolute 8.74 (H) 1.70 - 7.00 10*3/mm3    Lymphocytes, Absolute 1.62 0.70 - 3.10 10*3/mm3    Monocytes, Absolute 1.05 (H) 0.10 - 0.90 10*3/mm3    Eosinophils, Absolute 0.23 0.00 - 0.40 10*3/mm3    Basophils, Absolute 0.03 0.00 - 0.20 10*3/mm3    Immature Grans, Absolute 0.04 0.00 - 0.05 10*3/mm3    nRBC 0.0 0.0 - 0.2 /100 WBC       I ordered the above labs and reviewed the results    RADIOLOGY  XR Ribs Right With PA Chest   The lungs are well-expanded and clear except for a few  calcified granulomas scattered in the right lower lobe and calcified  right hilar lymph nodes. The heart size is normal.     Multiple views of the right ribs shows slight irregularity at the  costochondral junction of the right 8th rib and potentially representing  acute fracture at this location          I ordered the above noted radiological studies and reviewed the images on the PACS system.          EKG    ekg was interpreted by Dr. Ramírez, see   Darrell note for interpretation.    PROGRESS AND CONSULTS  12:19 PM  EKG ordered for evaluation.     12:52 PM  Labs and XR right ribs ordered. Norco ordered for pain.     12:57 PM  Reviewed pt's history and workup with Dr. Ramírez.  At bedside evaluation, they agree with the plan of care.    2:33 PM  Rechecked pt who is resting in NAD. Informed pt of normal labs and EKG. Discussed XR results shows a right eight rib fracture. Pt will be discharged with pain medication and incentive spirometry. Pt understands and agrees with the plan, all questions answered.    Reviewed implications of results, diagnosis, meds, responsibility to follow up, warning signs and symptoms of possible worsening, potential complications and reasons to return to ER with patient.  Discussed all results and noted any abnormalities with patient.  Discussed absolute need to recheck abnormalities with PCP    Discussed plan for discharge, as there is no emergent indication for admission.  Pt is agreeable and understands need for follow up and repeat testing.  Pt is aware that discharge does not mean that nothing is wrong but it indicates no emergency is present.  Pt is discharged with instructions to follow up with primary care doctor to have their blood pressure rechecked.       DIAGNOSIS  Final diagnoses:   Closed fracture of one rib of right side, initial encounter       FOLLOW UP   Adrienne Preston, PANandoC  [address] [facility].[address] [place] [address]  [phone]    In 2 days        RX     Medication List      New Prescriptions    HYDROcodone-acetaminophen 5-325 MG per tablet  Commonly known as:  NORCO  Take 1 tablet by mouth Every 4 (Four) Hours As Needed for Moderate Pain .     lidocaine 5 %  Commonly known as:  LIDODERM  Place 1 patch on the skin as directed by provider Daily. Remove & Discard   patch within 12 hours or as directed by MD Kim report [id number] reviewed.  Risks, benefits, alternatives discussed with patient.  Pt  "consents to treatment and agrees to follow up with PMD tomorrow for further care and any other prescriptions.           COURSE & MEDICAL DECISION MAKING  Pertinent Labs and Imaging studies that were ordered and reviewed are noted above.  Results were reviewed/discussed with the patient and they were also made aware of online assess.   Pt also made aware that some labs, such as cultures, will not be resulted during ER visit and follow up with PMD is necessary.     MEDICATIONS GIVEN IN ER  Medications   HYDROcodone-acetaminophen (NORCO) 7.5-325 MG per tablet 1 tablet (1 tablet Oral Given 5/4/19 1256)       /69   Pulse 75   Temp 97.6 °F (36.4 °C) (Tympanic)   Resp 18   Ht 170.2 cm (67\")   Wt 79.4 kg (175 lb)   SpO2 96%   BMI 27.41 kg/m²       I personally reviewed the past medical history, past surgical history, social history, family history, current medications and allergies as they appear in this chart.  The scribe's note accurately reflects the work and decisions made by me.     Documentation assistance provided by milo Gibson for MK Bob on 5/4/2019 at 2:25 PM. Information recorded by the scribe was done at my direction and has been verified and validated by me.        Neisha Gibson  05/04/19 1503       Shelia Dee APRN  05/04/19 1618    "

## 2019-05-04 NOTE — DISCHARGE INSTRUCTIONS
Medications as ordered  Use Incentive Spirometry every 2 hours while awake  Follow up with pmd in 5-7 days for recheck  Return to er for fever, chills, cough, shortness of air, chest pain, or any new or worsening symptoms

## 2020-07-09 ENCOUNTER — APPOINTMENT (OUTPATIENT)
Dept: MRI IMAGING | Facility: HOSPITAL | Age: 60
End: 2020-07-09

## 2020-07-09 ENCOUNTER — HOSPITAL ENCOUNTER (EMERGENCY)
Facility: HOSPITAL | Age: 60
Discharge: HOME | End: 2020-07-09
Attending: EMERGENCY MEDICINE | Admitting: EMERGENCY MEDICINE

## 2020-07-09 VITALS
TEMPERATURE: 97.8 F | WEIGHT: 180 LBS | RESPIRATION RATE: 18 BRPM | OXYGEN SATURATION: 94 % | DIASTOLIC BLOOD PRESSURE: 69 MMHG | BODY MASS INDEX: 28.25 KG/M2 | HEART RATE: 61 BPM | SYSTOLIC BLOOD PRESSURE: 124 MMHG | HEIGHT: 67 IN

## 2020-07-09 DIAGNOSIS — S39.012A LOW BACK STRAIN, INITIAL ENCOUNTER: Primary | ICD-10-CM

## 2020-07-09 LAB
ANION GAP SERPL CALCULATED.3IONS-SCNC: 8.7 MMOL/L (ref 5–15)
BASOPHILS # BLD AUTO: 0.02 10*3/MM3 (ref 0–0.2)
BASOPHILS NFR BLD AUTO: 0.3 % (ref 0–1.5)
BUN SERPL-MCNC: 9 MG/DL (ref 6–20)
BUN/CREAT SERPL: 8.7 (ref 7–25)
CALCIUM SPEC-SCNC: 9.7 MG/DL (ref 8.6–10.5)
CHLORIDE SERPL-SCNC: 105 MMOL/L (ref 98–107)
CO2 SERPL-SCNC: 24.3 MMOL/L (ref 22–29)
CREAT SERPL-MCNC: 1.03 MG/DL (ref 0.76–1.27)
DEPRECATED RDW RBC AUTO: 43.1 FL (ref 37–54)
EOSINOPHIL # BLD AUTO: 0.14 10*3/MM3 (ref 0–0.4)
EOSINOPHIL NFR BLD AUTO: 1.8 % (ref 0.3–6.2)
ERYTHROCYTE [DISTWIDTH] IN BLOOD BY AUTOMATED COUNT: 12.7 % (ref 12.3–15.4)
GFR SERPL CREATININE-BSD FRML MDRD: 74 ML/MIN/1.73
GLUCOSE SERPL-MCNC: 89 MG/DL (ref 65–99)
HCT VFR BLD AUTO: 48 % (ref 37.5–51)
HGB BLD-MCNC: 17.1 G/DL (ref 13–17.7)
IMM GRANULOCYTES # BLD AUTO: 0.03 10*3/MM3 (ref 0–0.05)
IMM GRANULOCYTES NFR BLD AUTO: 0.4 % (ref 0–0.5)
LYMPHOCYTES # BLD AUTO: 1.93 10*3/MM3 (ref 0.7–3.1)
LYMPHOCYTES NFR BLD AUTO: 24.5 % (ref 19.6–45.3)
MCH RBC QN AUTO: 33.2 PG (ref 26.6–33)
MCHC RBC AUTO-ENTMCNC: 35.6 G/DL (ref 31.5–35.7)
MCV RBC AUTO: 93.2 FL (ref 79–97)
MONOCYTES # BLD AUTO: 0.52 10*3/MM3 (ref 0.1–0.9)
MONOCYTES NFR BLD AUTO: 6.6 % (ref 5–12)
NEUTROPHILS NFR BLD AUTO: 5.23 10*3/MM3 (ref 1.7–7)
NEUTROPHILS NFR BLD AUTO: 66.4 % (ref 42.7–76)
NRBC BLD AUTO-RTO: 0 /100 WBC (ref 0–0.2)
PLATELET # BLD AUTO: 218 10*3/MM3 (ref 140–450)
PMV BLD AUTO: 10.1 FL (ref 6–12)
POTASSIUM SERPL-SCNC: 3.9 MMOL/L (ref 3.5–5.2)
RBC # BLD AUTO: 5.15 10*6/MM3 (ref 4.14–5.8)
SODIUM SERPL-SCNC: 138 MMOL/L (ref 136–145)
WBC # BLD AUTO: 7.87 10*3/MM3 (ref 3.4–10.8)

## 2020-07-09 PROCEDURE — 25010000002 KETOROLAC TROMETHAMINE PER 15 MG: Performed by: EMERGENCY MEDICINE

## 2020-07-09 PROCEDURE — 25010000002 MORPHINE PER 10 MG: Performed by: EMERGENCY MEDICINE

## 2020-07-09 PROCEDURE — 85025 COMPLETE CBC W/AUTO DIFF WBC: CPT | Performed by: EMERGENCY MEDICINE

## 2020-07-09 PROCEDURE — 99284 EMERGENCY DEPT VISIT MOD MDM: CPT

## 2020-07-09 PROCEDURE — 96375 TX/PRO/DX INJ NEW DRUG ADDON: CPT

## 2020-07-09 PROCEDURE — 80048 BASIC METABOLIC PNL TOTAL CA: CPT | Performed by: EMERGENCY MEDICINE

## 2020-07-09 PROCEDURE — 96374 THER/PROPH/DIAG INJ IV PUSH: CPT

## 2020-07-09 PROCEDURE — 72148 MRI LUMBAR SPINE W/O DYE: CPT

## 2020-07-09 RX ORDER — MORPHINE SULFATE 2 MG/ML
4 INJECTION, SOLUTION INTRAMUSCULAR; INTRAVENOUS ONCE
Status: COMPLETED | OUTPATIENT
Start: 2020-07-09 | End: 2020-07-09

## 2020-07-09 RX ORDER — KETOROLAC TROMETHAMINE 15 MG/ML
15 INJECTION, SOLUTION INTRAMUSCULAR; INTRAVENOUS ONCE
Status: COMPLETED | OUTPATIENT
Start: 2020-07-09 | End: 2020-07-09

## 2020-07-09 RX ORDER — CYCLOBENZAPRINE HCL 5 MG
5 TABLET ORAL 3 TIMES DAILY PRN
Qty: 15 TABLET | Refills: 0 | Status: SHIPPED | OUTPATIENT
Start: 2020-07-09

## 2020-07-09 RX ORDER — SODIUM CHLORIDE 0.9 % (FLUSH) 0.9 %
10 SYRINGE (ML) INJECTION AS NEEDED
Status: DISCONTINUED | OUTPATIENT
Start: 2020-07-09 | End: 2020-07-09 | Stop reason: HOSPADM

## 2020-07-09 RX ORDER — NAPROXEN SODIUM 550 MG/1
550 TABLET ORAL 2 TIMES DAILY WITH MEALS
Qty: 10 TABLET | Refills: 0 | Status: SHIPPED | OUTPATIENT
Start: 2020-07-09

## 2020-07-09 RX ADMIN — MORPHINE SULFATE 4 MG: 2 INJECTION, SOLUTION INTRAMUSCULAR; INTRAVENOUS at 13:47

## 2020-07-09 RX ADMIN — KETOROLAC TROMETHAMINE 15 MG: 15 INJECTION, SOLUTION INTRAMUSCULAR; INTRAVENOUS at 16:21

## 2020-07-09 RX ADMIN — SODIUM CHLORIDE, PRESERVATIVE FREE 10 ML: 5 INJECTION INTRAVENOUS at 13:50

## 2020-07-09 NOTE — ED TRIAGE NOTES
Pt c/o lower back pain that started last night at work. Pt was lifting something heavy at onset.     Pt wearing mask on arrival. Staff wearing mask and goggles at triage

## 2020-07-09 NOTE — DISCHARGE INSTRUCTIONS
Frequent heating pads with light stretching and increased range of motion as tolerated over the next couple of days, take medications as prescribed, gradual return to normal activity as tolerated, follow-up with primary care provider as needed, follow-up with neurosurgery group as needed, return to the emergency department for worsening symptoms as needed.

## 2020-07-09 NOTE — ED PROVIDER NOTES
EMERGENCY DEPARTMENT ENCOUNTER    Room Number:  27/27  Date of encounter:  7/9/2020  PCP: Adrienne Preston, KITTY  Historian: Patient      HPI:  Chief Complaint: Acute back pain  A complete HPI/ROS/PMH/PSH/SH/FH are unobtainable due to: None    Context: Ethan Almonte is a 59 y.o. male who presents to the ED via private vehicle from home with acute severe back pain in the lower back that goes across both sides but does not radiate into the abdomen or legs happened last night when he tried to  piece of metal equipment last night around 5PM at least 50 pounds heavy.  Patient states that he went home as he has had this happen to him before about once a year and typically will improve on its own, but has not had any improvement.  States that he can manage to use a walker at home to get to the bathroom but requires the support of his family to do so due to the severe pain.  Denies any focal numbness or weakness to the extremities, denies any saddle anesthesia or urinary or fecal incontinence or retention.  Pain improved when at rest but significantly painful with any type of movement.  Denies any prior back surgeries, denies being on any current medications.  Tried a pain pill at home with no relief.      MEDICAL RECORD REVIEW    No prior lower back imaging noted in epic    PAST MEDICAL HISTORY  Active Ambulatory Problems     Diagnosis Date Noted   • Hx-TIA (transient ischemic attack) 08/12/2016   • Hx of hemorrhoids 06/06/2017   • Personal history of colonic polyps 06/06/2017   • History of colon polyps 07/31/2018   • History of wrist fracture 01/09/2019   • Mixed hyperlipidemia 01/10/2019   • Impaired fasting glucose 01/10/2019   • Elevated MCV 01/10/2019     Resolved Ambulatory Problems     Diagnosis Date Noted   • No Resolved Ambulatory Problems     Past Medical History:   Diagnosis Date   • Actinic keratosis    • Acute right eye pain 08/04/2016   • BPH (benign prostatic hyperplasia)    • Colon polyps    •  Hemorrhoids    • Mass of anus 10/15/2013   • Pneumonia    • Postoperative urinary retention 03/06/2006   • Seborrheic keratosis    • Snoring    • Stroke (CMS/HCC)          PAST SURGICAL HISTORY  Past Surgical History:   Procedure Laterality Date   • ANUS SURGERY N/A 10/15/2013    EXCISION OF ANAL MASS, DR. JAKUB LEVI AT [facility]   • COLONOSCOPY N/A 05/14/2014    5 MM SESSILE HYPERPLASTIC POLYP IN TRANSVERSE, 7 MM SESSILE ADENOMATOUS POLYP IN DESCENDING, 12 MM SESSILE ADENOMATOUS POLYP IN SIGMOID, 4 MM SESSILE HYPERPLASTIC POLYP IN RECTUM,RESCOPE IN 6 MONTH, DR. JAKUB LEVI AT [facility]   • COLONOSCOPY N/A 01/14/2015    TATTOO SEEN IN PROXIMAL SIGMOID COLON, RESCOPE IN 3 YRS, DR. JAKUB LEVI AT [facility]   • COLONOSCOPY N/A 11/7/2018    3 HYPERPLASTIC POLYPS, RESCOPE IN 5 YRS, DR. JAKUB LEVI AT [facility]   • CRYOTHERAPY N/A 07/28/2017    RIGHT HAND, BILATERAL FOREHEAD, LEFT CHEEK, BRENTON PASCUAL PA-C   • HEMORRHOIDECTOMY N/A 03/02/2006    INTERNAL HEMORRHOIDS WITH THROMBOSIS AND PROLAPSE, DR. JULIAN CAMARGO AT [facility]   • SKIN BIOPSY Left 07/28/2017    LEFT TEMPLE LESION, BRENTON PASCUAL PA-C   • STRABISMUS SURGERY           FAMILY HISTORY  History reviewed. No pertinent family history.      SOCIAL HISTORY  Social History     Socioeconomic History   • Marital status:      Spouse name: Not on file   • Number of children: Not on file   • Years of education: Not on file   • Highest education level: Not on file   Tobacco Use   • Smoking status: Current Every Day Smoker     Packs/day: 1.00     Types: Cigarettes   • Smokeless tobacco: Never Used   Substance and Sexual Activity   • Alcohol use: Yes     Comment: RARELY   • Drug use: No   • Sexual activity: Defer         ALLERGIES  Patient has no known allergies.        REVIEW OF SYSTEMS  Review of Systems     All systems reviewed and negative except for those discussed in HPI.       PHYSICAL EXAM    I have reviewed the triage vital signs and nursing notes.    ED Triage Vitals   Temp Heart  Rate Resp BP SpO2   07/09/20 1230 07/09/20 1230 07/09/20 1230 07/09/20 1239 07/09/20 1230   97.8 °F (36.6 °C) 72 18 123/69 97 %      Temp src Heart Rate Source Patient Position BP Location FiO2 (%)   -- 07/09/20 1230 -- -- --    Monitor          Physical Exam  General: Awake, alert, appears uncomfortable, nontoxic, nondiaphoretic  HEENT: Mucous membranes moist, atraumatic, normocephalic, EOMI  Neck: Full ROM  Pulm: Symmetric, nonlabored  Cardiovascular: Regular rate and rhythm, normal S1/S2, intact distal pulses  GI: Soft, nontender, nondistended, no rebound, no guarding, bowel sounds present  MSK: Full ROM, no deformity, reproducible pain in the lumbosacral spine on the paraspinal musculature, some lower lumbar midline tenderness without sacral midline tenderness  Skin: Warm, dry  Neuro: Alert and oriented x 3, GCS 15, 5 out of 5 strength sensation bilateral lower extremities, positive passive leg raise bilaterally, moving all extremities, no focal deficits  Psych: Calm, cooperative      Surgical mask and gloves used during this encounter. Patient in surgical mask.      LAB RESULTS  Recent Results (from the past 24 hour(s))   Basic Metabolic Panel    Collection Time: 07/09/20  1:38 PM   Result Value Ref Range    Glucose 89 65 - 99 mg/dL    BUN 9 6 - 20 mg/dL    Creatinine 1.03 0.76 - 1.27 mg/dL    Sodium 138 136 - 145 mmol/L    Potassium 3.9 3.5 - 5.2 mmol/L    Chloride 105 98 - 107 mmol/L    CO2 24.3 22.0 - 29.0 mmol/L    Calcium 9.7 8.6 - 10.5 mg/dL    eGFR Non African Amer 74 >60 mL/min/1.73    BUN/Creatinine Ratio 8.7 7.0 - 25.0    Anion Gap 8.7 5.0 - 15.0 mmol/L   CBC Auto Differential    Collection Time: 07/09/20  1:38 PM   Result Value Ref Range    WBC 7.87 3.40 - 10.80 10*3/mm3    RBC 5.15 4.14 - 5.80 10*6/mm3    Hemoglobin 17.1 13.0 - 17.7 g/dL    Hematocrit 48.0 37.5 - 51.0 %    MCV 93.2 79.0 - 97.0 fL    MCH 33.2 (H) 26.6 - 33.0 pg    MCHC 35.6 31.5 - 35.7 g/dL    RDW 12.7 12.3 - 15.4 %    RDW-SD 43.1  37.0 - 54.0 fl    MPV 10.1 6.0 - 12.0 fL    Platelets 218 140 - 450 10*3/mm3    Neutrophil % 66.4 42.7 - 76.0 %    Lymphocyte % 24.5 19.6 - 45.3 %    Monocyte % 6.6 5.0 - 12.0 %    Eosinophil % 1.8 0.3 - 6.2 %    Basophil % 0.3 0.0 - 1.5 %    Immature Grans % 0.4 0.0 - 0.5 %    Neutrophils, Absolute 5.23 1.70 - 7.00 10*3/mm3    Lymphocytes, Absolute 1.93 0.70 - 3.10 10*3/mm3    Monocytes, Absolute 0.52 0.10 - 0.90 10*3/mm3    Eosinophils, Absolute 0.14 0.00 - 0.40 10*3/mm3    Basophils, Absolute 0.02 0.00 - 0.20 10*3/mm3    Immature Grans, Absolute 0.03 0.00 - 0.05 10*3/mm3    nRBC 0.0 0.0 - 0.2 /100 WBC       Ordered the above labs and independently reviewed the results.        RADIOLOGY  Mri Lumbar Spine Without Contrast    Result Date: 7/9/2020  MRI LUMBAR SPINE WITHOUT CONTRAST  CLINICAL HISTORY: Severe back pain from lifting heavy object.  TECHNIQUE: MRI of the lumbar spine was obtained with sagittal T1, proton-density, and T2-weighted images. Additionally, there are axial T1 and T2-weighted images through the lumbar spine.  FINDINGS:  The conus medullaris terminates at the level of the mid to lower body of L1 and has normal signal intensity.  At T12-L1, there is a disc bulge which mildly indents the ventral subarachnoid space. Minimal foraminal narrowing is noted. A posterior annular fissure is seen at T12-L1.  At L1-L2, there is no significant canal or foraminal stenosis.  At L2-L3, there is minimal disc bulging resulting in minimal canal and foraminal narrowing. Minor degenerative endplate marrow edema is seen at the L2-L3 level.  At L3-L4, again there is minimal disc bulging resulting in minimal canal and foraminal narrowing.  At L4-L5, there is no significant canal or foraminal narrowing. There is mild facet arthropathy.  At the L5 level, there are bilateral pars defects. There is anterior spondylolisthesis of L5 on S1 by approximately 2 mm. No significant canal or foraminal narrowing is noted at the L5-S1  level.       Bilateral pars defects are noted at the L5 level and there is grade 1 anterior spondylolisthesis of L5 on S1 by approximately 2 mm.  There is a annular fissure identified at the T12-L1 level.  Otherwise, relatively minimal degrees of canal and foraminal narrowing are noted within the lumbar spine as discussed in detail above.  These findings were discussed with Dr. Luiz Irby on 07/09/2020 at approximately 3:40 PM.  This report was finalized on 7/9/2020 4:21 PM by Dr. Alvaro Yancey M.D.        I ordered the above noted radiological studies. Reviewed by me, discussed with Radiologist.  See dictation for official radiology interpretation.      PROCEDURES    Procedures      MEDICATIONS GIVEN IN ER    Medications   morphine injection 4 mg (4 mg Intravenous Given 7/9/20 1347)   ketorolac (TORADOL) injection 15 mg (15 mg Intravenous Given 7/9/20 1621)         PROGRESS, DATA ANALYSIS, CONSULTS, AND MEDICAL DECISION MAKING    All labs have been independently reviewed by me.  All radiology studies have been reviewed by me and discussed with radiologist dictating the report.   EKG's independently viewed and interpreted by me.  Discussion below represents my analysis of pertinent findings related to patient's condition, differential diagnosis, treatment plan and final disposition.        ED Course as of Jul 09 1954   Thu Jul 09, 2020   1542 Discussed lumbar MRI findings with Dr. Yancey (Radiologist), no evidence any acute emergent findings, no evidence of any significant canal stenosis or cord compression.    [DC]   1545 Reevaluate the patient, has had some mild pain relief, will add Toradol prior to discharge with a prescription for Anaprox and Flexeril, advised frequent warm heating pads and light stretching and increased range of motion of the low back over the next few days with primary care follow-up, return to the emerge department for worsening symptoms as needed.    [DC]      ED Course User Index  [DC]  Luiz Irby MD       AS OF 19:54 VITALS:    BP - 124/69  HR - 61  TEMP - 97.8 °F (36.6 °C)  02 SATS - 94%        DIAGNOSIS  Final diagnoses:   Low back strain, initial encounter         DISPOSITION  DISCHARGE    Patient discharged in stable condition.    Reviewed implications of results, diagnosis, meds, responsibility to follow up, warning signs and symptoms of possible worsening, potential complications and reasons to return to ER.    Patient/Family voiced understanding of above instructions.    Discussed plan for discharge, as there is no emergent indication for admission. Patient referred to primary care provider for BP management due to today's BP. Pt/family is agreeable and understands need for follow up and repeat testing.  Pt is aware that discharge does not mean that nothing is wrong but it indicates no emergency is present that requires admission and they must continue care with follow-up as given below or physician of their choice.     FOLLOW-UP  [facility] Emergency Department  [address] [facility] [id number] [phone]    As needed, If symptoms worsen    Adrienne Preston PA-C  [address] [facility]  [facility] [id number] [phone]    Schedule an appointment as soon as possible for a visit   As needed    [facility] NEUROSURGERY  [address] [facility] [address]  [facility] [address] [phone]  Schedule an appointment as soon as possible for a visit   As needed         Medication List      New Prescriptions    cyclobenzaprine 5 MG tablet  Commonly known as:  FLEXERIL  Take 1 tablet by mouth 3 (Three) Times a Day As Needed for Muscle Spasms.   Do not drive while taking this medication     naproxen sodium 550 MG tablet  Commonly known as:  ANAPROX  Take 1 tablet by mouth 2 (Two) Times a Day With Meals.                     Luiz Irby MD  07/09/20 1954

## 2020-07-09 NOTE — ED NOTES
Patient reports bending over at the waist lifting at least 50 lbs worth of metal equipment yesterday afternoon at 1700. This RN is wearing mask, gloves and goggles at all times during patient interaction.     Tico Harmon RN  07/09/20 1242       Tico Harmon RN  07/09/20 125

## 2020-07-22 PROBLEM — R71.8 ELEVATED MCV: Status: RESOLVED | Noted: 2019-01-10 | Resolved: 2020-07-22

## 2020-07-24 ENCOUNTER — TRANSCRIBE ORDERS (OUTPATIENT)
Dept: OCCUPATIONAL THERAPY | Facility: CLINIC | Age: 60
End: 2020-07-24

## 2020-07-24 DIAGNOSIS — S39.012S STRAIN OF LUMBAR REGION, SEQUELA: Primary | ICD-10-CM

## 2020-07-27 ENCOUNTER — TREATMENT (OUTPATIENT)
Dept: PHYSICAL THERAPY | Facility: CLINIC | Age: 60
End: 2020-07-27

## 2020-07-27 DIAGNOSIS — S39.012S STRAIN OF LUMBAR REGION, SEQUELA: Primary | ICD-10-CM

## 2020-07-27 PROCEDURE — 97162 PT EVAL MOD COMPLEX 30 MIN: CPT | Performed by: PHYSICAL THERAPIST

## 2020-07-27 PROCEDURE — 97530 THERAPEUTIC ACTIVITIES: CPT | Performed by: PHYSICAL THERAPIST

## 2020-07-27 PROCEDURE — 97110 THERAPEUTIC EXERCISES: CPT | Performed by: PHYSICAL THERAPIST

## 2020-07-27 PROCEDURE — 97140 MANUAL THERAPY 1/> REGIONS: CPT | Performed by: PHYSICAL THERAPIST

## 2020-07-27 NOTE — PROGRESS NOTES
"  Orthopedic / Sports / Industrial Physical Therapy  Physical Therapy Initial Evaluation and Plan of Care    Patient Name: Ethan Almonte          :  1960  Referring Physician: Sangita Gilliam APRN  Diagnosis: Strain of lumbar region, sequela [S39.012S]    Date of Evaluation: 2020  ______________________________________________________________________    Subjective Evaluation    History of Present Illness  Date of onset: 2020  Mechanism of injury: Picking up 50# metal object (conveyer belt) with another employee  and had pain across LB - Went to ER 2020 b/c of severe LBP- Noted pain in (L) leg when he tried to bear wt. 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 Pain  Current pain ratin  At worst pain ratin  Location: LBP L>R   Denies LE Radicular symptoms -   Exacerbated by: Standing, bending, socks/shoes, dressing,  Rising;  Out of bed/car;  Turning in bed.  Progression: improved    Diagnostic Tests  MRI studies: abnormal (See below for MRI Report / Impression)    Treatments  Current treatment: medication  Patient Goals  Patient/family treatment goals: Pain alleviation; Mobility, strength to allow ADL's and normal job w/o restrictions -       MRI: 2020  IMPRESSION: (from MRI Report in Epic)     \"Bilateral pars defects are noted at the L5 level and there is grade 1  anterior spondylolisthesis of L5 on S1 by approximately 2 mm.  There is a annular fissure identified at the T12-L1 level.  Otherwise, relatively minimal degrees of canal and foraminal narrowing  are noted within the lumbar spine ...\"  ___________________________________________________  Objective          Postural Observations    Additional Postural Observation Details  (L)  Ilium / ASIS / PSIS higher vs (R)  Increased lumbar lordosis    Tenderness     Additional Tenderness Details  Significantly tender " L5-S1 and (L) SI and LPS (L) -     Active Range of Motion     Additional Active Range of Motion Details  Lumbar AROM: Flexion 1/2 norm with increased pain LB L>R                            Extension: 1/2 norm with increased central LBP                            SB (B) 2/3 norm w/ pain (L) w/ (L) SB    Strength/Myotome Testing     Additional Strength Details  LE Myotomes WNL (B) including heel / toe walk    Tests     Additional Tests Details  (-) SLR - Painful, but mostly due to tight hamstrings  (+) SI Jt Dysfunction  / Upshear (R)        See Treatment Flow sheet for Exercises, Manual therapy, and modalities.   FUNCTIONAL ACTIVITIES: X 12 min  · TAPING / BRACING: K-Tape to Unload / Inhibit LPS; 2) Unload L4-5, L5-S1 across  · Jt protection, ADL modification; Posture and     ___________________________________________________  Assessment & Plan     Assessment  Assessment details: Lumbar strain w/ apparent Spondylolisthesis L5-S1 Gd 1 per MRI Report -   Probable SI Jt dysfunction L>R    PROBLEMS: Pain; Limited mobility; Intolerance to ADL's and normal job duties -   PROGNOSIS: Good    GOALS:   SHORT TERM GOALS: 2 weeks:  1) HEP Initiated; 2) Pain decreased 50%:   3) AROM / flexibility LB grossly improved  4) Improved functional ability grossly;     LONG TERM GOALS: 4 weeks (or at time of DISCHARGE): 1) (I) HEP; 2) AROM WFL and pain free; 3) Strength / mobility to be able to perform all ADL's and job-related activities w/o restrictions;  4) Pt (I) w/ Body Mechanics / Proper lifting techniques, etc.       Plan  Planned therapy interventions: abdominal trunk stabilization, flexibility, home exercise program, joint mobilization, manual therapy, neuromuscular re-education, postural training, soft tissue mobilization, spinal/joint mobilization, strengthening, stretching and therapeutic activities (Modalities prn; Taping/ bracing prn; )  Frequency: 3x week  Duration in weeks: 4  Treatment plan discussed with:  patient    ___________________________________________________  Manual Therapy:    10     mins  12361;   Therapeutic Exercise:    10     mins  02885;     Neuromuscular Robert:        mins  94553;   Therapeutic Activity:     12     mins  62784;     Ultrasound:     06     mins  79117;    Electrical Stimulation:   20     mins  86999 ( );  Dry Needling          mins self-pay   Gait Training:          mins  44728;  EVAL TIME:   20 min    Timed Treatment:   38   mins                Total Treatment:     80   mins    PT SIGNATURE:   Casey Butler, PT  DATE TREATMENT INITIATED: 7/27/2020  ___________________________________________________  Initial Certification  Certification Period: 10/25/2020  I certify that the therapy services are furnished while this patient is under my care.  The services outlined above are required by this patient, and will be reviewed every 90 days.     PHYSICIAN: ________________________________  DATE: ______  Sangita Gilliam APRN        Please sign and return via fax to [phone].. Thank you, [facility].  ______________________________________________________________________  [address] [place] [address]  Phone: [phone] Fax: [phone]    Access Code: [email or web address]   URL: [email or web address]   Date: 07/27/2020   Prepared by: Casey Butler     Exercises  Supine Single Knee to Chest Stretch - 5 reps - 1 sets - 30s hold - 2-3x daily - 7x weekly  Supine Piriformis Stretch with Leg Straight - 3-5 reps - 1 sets - 30s hold - 2-3x daily - 7x weekly  Supine Lower Trunk Rotation - 10 reps - 1 sets - 10s hold - 3x daily - 7x weekly  Supine Isometric Transversus Abdominis Contraction - 20 -30 reps - 1 sets - 10s hold - 1-2x daily - 7x weekly  Supine Gluteal Sets - 20 reps - 1-2 sets - 10s hold - 1x daily - 7x weekly

## 2020-07-29 ENCOUNTER — TREATMENT (OUTPATIENT)
Dept: PHYSICAL THERAPY | Facility: CLINIC | Age: 60
End: 2020-07-29

## 2020-07-29 DIAGNOSIS — S39.012S STRAIN OF LUMBAR REGION, SEQUELA: Primary | ICD-10-CM

## 2020-07-29 PROCEDURE — 97530 THERAPEUTIC ACTIVITIES: CPT | Performed by: PHYSICAL THERAPIST

## 2020-07-29 PROCEDURE — 97140 MANUAL THERAPY 1/> REGIONS: CPT | Performed by: PHYSICAL THERAPIST

## 2020-07-29 PROCEDURE — 97110 THERAPEUTIC EXERCISES: CPT | Performed by: PHYSICAL THERAPIST

## 2020-07-30 ENCOUNTER — TREATMENT (OUTPATIENT)
Dept: PHYSICAL THERAPY | Facility: CLINIC | Age: 60
End: 2020-07-30

## 2020-07-30 DIAGNOSIS — S39.012S STRAIN OF LUMBAR REGION, SEQUELA: Primary | ICD-10-CM

## 2020-07-30 PROCEDURE — 97110 THERAPEUTIC EXERCISES: CPT | Performed by: PHYSICAL THERAPIST

## 2020-07-30 PROCEDURE — 97530 THERAPEUTIC ACTIVITIES: CPT | Performed by: PHYSICAL THERAPIST

## 2020-07-30 PROCEDURE — 97140 MANUAL THERAPY 1/> REGIONS: CPT | Performed by: PHYSICAL THERAPIST

## 2020-07-30 NOTE — PROGRESS NOTES
Physical Therapy Daily Progress Note    Patient Name: Ethan Almonte         :  1960  Referring Physician:       Subjective   Ethan Almonte reports: continued improvement with  pain and improved mobility and function - Standing HS stretches increased LBP while doing them -     Objective   Level Pelvis;  Very tender (R) >>(L) Lower Thoracic / Upper Lumbar paraspinals w/ TPs  Tender central T12-L2/3    See Exercise, Manual, and Modality Logs for complete treatment.     Functional / Therapeutic Activities:  25 min  · TAPING / BRACING: K-Tape to Inhibit Lumbar and lower Thoracic PS; 2 longitudinal unloading strips T11, L2  · SEE EXERCISE FLOW SHEET -   · Worked on proper lifting techniques   · Jt protection, ADL modification; Posture and      Assessment/Plan  Lumbar strain w/ apparent Spondylolisthesis L5-S1 Gd 1 per MRI Report -   Probable SI Jt dysfunction L>R  Improving with decreased pain and improved mobility and function -   Taping helpful -   Severely tight Hamstrings     PLAN:   Progress strengthening /stabilization /functional activity  -   ADD NEXT SESSION:  1)SUPINE HAMSTRING Stretches (gentle)                                           2) GENTLE MANUAL HS STRETCHING;                                           3) BOX LIFT Side/Side Next session (SEE EX SHEET)     _________________________________________________  Manual Therapy:    15     mins  03738;  Therapeutic Exercise:    25     mins  15414;     Neuromuscular Robert:        mins  39947;    Therapeutic Activity:     25     mins  80175;     Gait Training:           mins  34534;     Ultrasound:     08     mins  21674;    Electrical Stimulation:         mins  02668 ( );  Dry Needling          mins self-pay    Timed Treatment:   73   mins                  Total Treatment:     80   mins    Casey Butler PT  Physical Therapist

## 2020-07-31 ENCOUNTER — TELEPHONE (OUTPATIENT)
Dept: PHYSICAL THERAPY | Facility: CLINIC | Age: 60
End: 2020-07-31

## 2020-08-04 ENCOUNTER — TREATMENT (OUTPATIENT)
Dept: PHYSICAL THERAPY | Facility: CLINIC | Age: 60
End: 2020-08-04

## 2020-08-04 DIAGNOSIS — S39.012S STRAIN OF LUMBAR REGION, SEQUELA: Primary | ICD-10-CM

## 2020-08-04 PROCEDURE — 97110 THERAPEUTIC EXERCISES: CPT | Performed by: PHYSICAL THERAPIST

## 2020-08-04 PROCEDURE — 97530 THERAPEUTIC ACTIVITIES: CPT | Performed by: PHYSICAL THERAPIST

## 2020-08-04 NOTE — PROGRESS NOTES
Physical Therapy Daily Progress Note    Patient Name: Ethan Almonte         :  1960  Referring Physician: Sangita Gilliam APRN      Subjective   Ethan Almonte reports: continued improvement with decreased pain and improved mobility and function - Been working 3 hrs at a time, but his boss lets him control what he does - no pain with working like this -     Objective   Level pelvis and minimal tenderness -    See Exercise, Manual, and Modality Logs for complete treatment.  ADDED: 1)SUPINE HAMSTRING Stretches (gentle)                                           2) GENTLE MANUAL HS STRETCHING;                                           3) BOX LIFT Side/Side  (SEE EX SHEET)     Functional / Therapeutic Activities:  25 min  · TAPING / BRACING: K-Tape to Inhibit Lumbar and lower Thoracic PS; 2 longitudinal unloading strips T11, L2 -HELD TODAY_  · SEE EXERCISE FLOW SHEET -   · Continued to Work on proper lifting techniques   · Jt protection, ADL modification; Posture and       Assessment/Plan  Lumbar strain w/ apparent Spondylolisthesis L5-S1 Gd 1 per MRI Report -    SI Jt dysfunction L>R  Improving with decreased pain and improved mobility and function -   Taping helpful -   Severely tight Hamstrings      PLAN:   Progress strengthening /stabilization /functional activity  -   ADD NEXT SESSION:  1)SUPINE HAMSTRING Stretches (gentle)                                           2) GENTLE MANUAL HS STRETCHING;                                           3) BOX LIFT Side/Side Next session (SEE EX SHEET)  _________________________________________________  Manual Therapy:                 mins  98202;  Therapeutic Exercise:    28     mins  05574;     Neuromuscular Robert:        mins  45067;    Therapeutic Activity:      25     mins  19878;     Gait Training:                      mins  93112;     Ultrasound:                          mins  49644;    Electrical Stimulation:         mins  27027 ( );  Dry Needling                        mins self-pay     Timed Treatment:   53   mins                  Total Treatment:     60   mins  Casey Butler, PT  Physical Therapist

## 2020-08-05 ENCOUNTER — TREATMENT (OUTPATIENT)
Dept: PHYSICAL THERAPY | Facility: CLINIC | Age: 60
End: 2020-08-05

## 2020-08-05 DIAGNOSIS — S39.012S STRAIN OF LUMBAR REGION, SEQUELA: Primary | ICD-10-CM

## 2020-08-05 PROCEDURE — 97110 THERAPEUTIC EXERCISES: CPT | Performed by: PHYSICAL THERAPIST

## 2020-08-05 PROCEDURE — 97530 THERAPEUTIC ACTIVITIES: CPT | Performed by: PHYSICAL THERAPIST

## 2020-08-05 NOTE — PROGRESS NOTES
Physical Therapy Daily Progress Note    Patient Name: Ethan Almonte         :  1960  Referring Physician: Sangita Gilliam APRN      Subjective   Ethan Almonte reports: continued improvement w/ minimal pain and improved functional ability  -     Objective   Level pelvis and minimal / no tenderness -     See Exercise, Manual, and Modality Logs for complete treatment.     Functional / Therapeutic Activities:  25 min  · TAPING / BRACING: K-Tape to Inhibit Lumbar and lower Thoracic PS; 2 longitudinal unloading strips T11, L2 -HELD TODAY_  · SEE EXERCISE FLOW SHEET -   · Continued to Work on proper lifting techniques   · Jt protection, ADL modification; Posture and       Assessment/Plan  Lumbar strain w/ apparent Spondylolisthesis L5-S1 Gd 1 per MRI Report -    SI Jt dysfunction L>R  Improving with decreased pain and improved mobility and function -      PLAN:   Progress strengthening /stabilization /functional activity  - Anticipate DC next session - MD OV Friday -      _________________________________________________  Manual Therapy:                 mins  36673;  Therapeutic Exercise:    30     mins  79146;     Neuromuscular Robert:        mins  47000;    Therapeutic Activity:      25     mins  89355;     Gait Training:                      mins  76505;     Ultrasound:                          mins  77893;    Electrical Stimulation:         mins  05042 ( );  Dry Needling                       mins self-pay     Timed Treatment:   55   mins                  Total Treatment:     60   mins  Casey Butler PT  Physical Therapist

## 2020-08-07 ENCOUNTER — TREATMENT (OUTPATIENT)
Dept: PHYSICAL THERAPY | Facility: CLINIC | Age: 60
End: 2020-08-07

## 2020-08-07 DIAGNOSIS — S39.012S STRAIN OF LUMBAR REGION, SEQUELA: Primary | ICD-10-CM

## 2020-08-07 PROCEDURE — 97530 THERAPEUTIC ACTIVITIES: CPT | Performed by: PHYSICAL THERAPIST

## 2020-08-07 NOTE — PROGRESS NOTES
------------------------------------------------------------------------------------------------------   MD PROGRESS NOTE    Patient: Ethan Almonte        : 1960  Diagnosis/ICD-10 Code:  Strain of lumbar region, sequela [S39.012S]  Referring practitioner: KAMLA Weston  Date of Initial Visit: 2020                  Today's Date: 2020  _________________________________________________________________    Thank you for the referral of Mr. Almonte to [facility].  Mr. Almonte has attended 6 PT sessions and their treatment has consisted of: modalities prn, manual therapy, therapeutic exercise, kinesio taping, patient education, and HEP.     Subjective   Ethan Almonte reports: significant improvement with pain alleviation and restoration of mobility and functional abilities - Pt notes he feels like he could perform his regular job at this time -   ___________________________________________________________________  Objective              OBSERVATION: Level pelvis - Good transitional movements - Normal gait              PALPATION: Minimal / no palpable tenderness lumbar spine, etc        AROM: Lumbar spine: WFL (Tight HS limit flexion) w/o pain   STRENGTH: LE Myotomes WNL   DTR's/SENSATION: WNL   SPECIAL TESTS: (-) SLR; (-) SI Jt Dysfunction -   ACTIVITY TOLERANCE: Pt able to tolerate normal ADL's and job activities as simulated in Physical Therapy - No limitations noted -      See Exercise, Manual, and Modality Logs for complete treatment.      Functional / Therapeutic Activities:  X 25 min  · TAPING / BRACING: NA  · FUNCTIONAL ASSESSMENT -   · Jt protection, ADL modification; Posture and    ___________________________________________________________________   Assessment/Plan  Lumbar strain; SI Jt dysfunction;   Mr. Almonte would benefit from continuing his HEP and discontinue formal Physical Therapy and RTW w/o restrictions as his Provider deems appropriate.   GOALS: ALL  MET    P: Recommend continuing his HEP and discontinue formal Physical Therapy and RTW w/o restrictions as his Provider deems appropriate.      Please advise after your exam.    Thank you again for this referral of Mr. Almonte to [facility].    PT Signature: ______________________________   Casey Butler, PT  ____________________________________________________________________  Manual Therapy:         mins  46083;    Therapeutic Exercise:        mins  61311;     Neuromuscular Robert:        mins  76332;     Therapeutic Activity:    25     mins  26198;     Ultrasound:          mins  92080;     Electrical Stimulation:       mins  74321 ( );  Dry Needling          mins self-pay     Gait Training:          mins  79925;    Timed Treatment:   25   mins             Total Treatment:     28   mins    ______________________________________________________________________  [address] [place] [address]  Phone: [phone] Fax: [phone]

## 2023-08-24 ENCOUNTER — PREP FOR SURGERY (OUTPATIENT)
Dept: OTHER | Facility: HOSPITAL | Age: 63
End: 2023-08-24
Payer: COMMERCIAL

## 2023-08-24 DIAGNOSIS — Z86.010 HISTORY OF COLON POLYPS: Primary | ICD-10-CM

## 2023-12-04 ENCOUNTER — ANESTHESIA (OUTPATIENT)
Dept: GASTROENTEROLOGY | Facility: HOSPITAL | Age: 63
End: 2023-12-04
Payer: COMMERCIAL

## 2023-12-04 ENCOUNTER — ANESTHESIA EVENT (OUTPATIENT)
Dept: GASTROENTEROLOGY | Facility: HOSPITAL | Age: 63
End: 2023-12-04
Payer: COMMERCIAL

## 2023-12-04 ENCOUNTER — HOSPITAL ENCOUNTER (OUTPATIENT)
Facility: HOSPITAL | Age: 63
Setting detail: HOSPITAL OUTPATIENT SURGERY
Discharge: HOME | End: 2023-12-04
Attending: COLON & RECTAL SURGERY | Admitting: COLON & RECTAL SURGERY
Payer: COMMERCIAL

## 2023-12-04 VITALS
SYSTOLIC BLOOD PRESSURE: 116 MMHG | DIASTOLIC BLOOD PRESSURE: 68 MMHG | HEART RATE: 80 BPM | RESPIRATION RATE: 12 BRPM | HEIGHT: 67 IN | OXYGEN SATURATION: 97 % | BODY MASS INDEX: 25.11 KG/M2 | WEIGHT: 160 LBS

## 2023-12-04 DIAGNOSIS — Z86.010 HISTORY OF COLON POLYPS: ICD-10-CM

## 2023-12-04 PROCEDURE — 88305 TISSUE EXAM BY PATHOLOGIST: CPT | Performed by: COLON & RECTAL SURGERY

## 2023-12-04 PROCEDURE — 25010000002 PROPOFOL 10 MG/ML EMULSION: Performed by: NURSE ANESTHETIST, CERTIFIED REGISTERED

## 2023-12-04 PROCEDURE — 25810000003 LACTATED RINGERS PER 1000 ML: Performed by: COLON & RECTAL SURGERY

## 2023-12-04 PROCEDURE — 25010000002 PHENYLEPHRINE 10 MG/ML SOLUTION: Performed by: NURSE ANESTHETIST, CERTIFIED REGISTERED

## 2023-12-04 RX ORDER — SODIUM CHLORIDE, SODIUM LACTATE, POTASSIUM CHLORIDE, CALCIUM CHLORIDE 600; 310; 30; 20 MG/100ML; MG/100ML; MG/100ML; MG/100ML
1000 INJECTION, SOLUTION INTRAVENOUS CONTINUOUS
Status: DISCONTINUED | OUTPATIENT
Start: 2023-12-04 | End: 2023-12-04 | Stop reason: HOSPADM

## 2023-12-04 RX ORDER — EPHEDRINE SULFATE 50 MG/ML
INJECTION, SOLUTION INTRAVENOUS AS NEEDED
Status: DISCONTINUED | OUTPATIENT
Start: 2023-12-04 | End: 2023-12-04 | Stop reason: SURG

## 2023-12-04 RX ORDER — PROPOFOL 10 MG/ML
VIAL (ML) INTRAVENOUS AS NEEDED
Status: DISCONTINUED | OUTPATIENT
Start: 2023-12-04 | End: 2023-12-04 | Stop reason: SURG

## 2023-12-04 RX ORDER — PHENYLEPHRINE HYDROCHLORIDE 10 MG/ML
INJECTION INTRAVENOUS AS NEEDED
Status: DISCONTINUED | OUTPATIENT
Start: 2023-12-04 | End: 2023-12-04 | Stop reason: SURG

## 2023-12-04 RX ORDER — LIDOCAINE HYDROCHLORIDE 20 MG/ML
INJECTION, SOLUTION INFILTRATION; PERINEURAL AS NEEDED
Status: DISCONTINUED | OUTPATIENT
Start: 2023-12-04 | End: 2023-12-04 | Stop reason: SURG

## 2023-12-04 RX ADMIN — SODIUM CHLORIDE, POTASSIUM CHLORIDE, SODIUM LACTATE AND CALCIUM CHLORIDE 1000 ML: 600; 310; 30; 20 INJECTION, SOLUTION INTRAVENOUS at 07:29

## 2023-12-04 RX ADMIN — PROPOFOL 180 MCG/KG/MIN: 10 INJECTION, EMULSION INTRAVENOUS at 08:01

## 2023-12-04 RX ADMIN — PROPOFOL 80 MG: 10 INJECTION, EMULSION INTRAVENOUS at 08:00

## 2023-12-04 RX ADMIN — LIDOCAINE HYDROCHLORIDE 60 MG: 20 INJECTION, SOLUTION INFILTRATION; PERINEURAL at 08:00

## 2023-12-04 RX ADMIN — PHENYLEPHRINE HYDROCHLORIDE 100 MCG: 10 INJECTION INTRAVENOUS at 08:06

## 2023-12-04 RX ADMIN — EPHEDRINE SULFATE 10 MG: 50 INJECTION INTRAVENOUS at 08:18

## 2023-12-04 NOTE — ANESTHESIA PREPROCEDURE EVALUATION
Anesthesia Evaluation     Patient summary reviewed and Nursing notes reviewed                Airway   Mallampati: II  TM distance: >3 FB  Neck ROM: full  Dental      Pulmonary - negative pulmonary ROS   (+) a smoker Current,  Cardiovascular - negative cardio ROS    ECG reviewed  Rate: normal    (+) hyperlipidemia      Neuro/Psych- negative ROS  (+) CVA  GI/Hepatic/Renal/Endo - negative ROS     Musculoskeletal (-) negative ROS    Abdominal    Substance History - negative use  (+) alcohol use     OB/GYN negative ob/gyn ROS         Other                      Anesthesia Plan    ASA 3     MAC     intravenous induction     Anesthetic plan, risks, benefits, and alternatives have been provided, discussed and informed consent has been obtained with: patient.    Plan discussed with CRNA.    CODE STATUS:

## 2023-12-04 NOTE — H&P
Ethan Almonte is a 63 y.o. male  who is referred by Jakub Phelan MD for a colonoscopy. He   has an indications: previous adenomatous polyp.     He denies any change in bowel function, melena, or hematochezia.    Past Medical History:   Diagnosis Date    Actinic keratosis     Acute right eye pain 08/04/2016    WITH VISUAL DISTURBANCE, SEEN AT [facility]    BPH (benign prostatic hyperplasia)     Colon polyps     Hemorrhoids     Mass of anus 10/15/2013    PATH: FIBRO-EPITHELIAL POLYP WITH CONDYLOMATOUS CHANGE, HPV TESTING NEGATIVE    Pneumonia     Postoperative urinary retention 03/06/2006    ADMITTED TO [facility]    Seborrheic keratosis     Snoring     Stroke     TIA       Past Surgical History:   Procedure Laterality Date    ANUS SURGERY N/A 10/15/2013    EXCISION OF ANAL MASS, DR. JAKUB PHELAN AT [facility]    COLONOSCOPY N/A 05/14/2014    5 MM SESSILE HYPERPLASTIC POLYP IN TRANSVERSE, 7 MM SESSILE ADENOMATOUS POLYP IN DESCENDING, 12 MM SESSILE ADENOMATOUS POLYP IN SIGMOID, 4 MM SESSILE HYPERPLASTIC POLYP IN RECTUM,RESCOPE IN 6 MONTH, DR. JAKUB PHELAN AT [facility]    COLONOSCOPY N/A 01/14/2015    TATTOO SEEN IN PROXIMAL SIGMOID COLON, RESCOPE IN 3 YRS, DR. JAKUB PHELAN AT [facility]    COLONOSCOPY N/A 11/7/2018    3 HYPERPLASTIC POLYPS, RESCOPE IN 5 YRS, DR. JAKUB PHELAN AT [facility]    CRYOTHERAPY N/A 07/28/2017    RIGHT HAND, BILATERAL FOREHEAD, LEFT CHEEK, [facility]CUAL PA-C    HEMORRHOIDECTOMY N/A 03/02/2006    INTERNAL HEMORRHOIDS WITH THROMBOSIS AND PROLAPSE, DR. JULIAN CAMARGO AT [facility]    SKIN BIOPSY Left 07/28/2017    LEFT TEMPLE LESION, [facility]AL PA-C    STRABISMUS SURGERY         Medications Prior to Admission   Medication Sig Dispense Refill Last Dose    aspirin 81 MG EC tablet Take 1 tablet by mouth Daily.   More than a month    atorvastatin (LIPITOR) 20 MG tablet Take 1 tablet by mouth Daily. For cholesterol 30 tablet 11 More than a month    cyclobenzaprine (FLEXERIL) 5 MG tablet Take 1 tablet by mouth 3 (Three) Times a Day As Needed for  Muscle Spasms. Do not drive while taking this medication 15 tablet 0 More than a month    docusate sodium (COLACE) 100 MG capsule Take 1 capsule by mouth Daily.   12/2/2023    HYDROcodone-acetaminophen (NORCO) 5-325 MG per tablet Take 1 tablet by mouth Every 4 (Four) Hours As Needed for Moderate Pain . 12 tablet 0 More than a month    lidocaine (LIDODERM) 5 % Place 1 patch on the skin as directed by provider Daily. Remove & Discard patch within 12 hours or as directed by MD 30 each 0 More than a month    naproxen sodium (ANAPROX) 550 MG tablet Take 1 tablet by mouth 2 (Two) Times a Day With Meals. 10 tablet 0 More than a month       No Known Allergies    History reviewed. No pertinent family history.    Social History     Socioeconomic History    Marital status:    Tobacco Use    Smoking status: Every Day     Packs/day: 1     Types: Cigarettes    Smokeless tobacco: Never   Vaping Use    Vaping Use: Never used   Substance and Sexual Activity    Alcohol use: Yes     Comment: RARELY    Drug use: No    Sexual activity: Defer       Review of Systems   Gastrointestinal:  Negative for abdominal pain, nausea and vomiting.   All other systems reviewed and are negative.      Vitals:    12/04/23 0722   BP: 113/75   Pulse: 61   Resp: 16   SpO2: 97%         Physical Exam  Constitutional:       Appearance: He is well-developed.   HENT:      Head: Normocephalic and atraumatic.   Eyes:      Pupils: Pupils are equal, round, and reactive to light.   Cardiovascular:      Rate and Rhythm: Regular rhythm.   Pulmonary:      Effort: Pulmonary effort is normal.   Abdominal:      General: There is no distension.      Palpations: Abdomen is soft.   Musculoskeletal:         General: Normal range of motion.   Skin:     General: Skin is warm and dry.   Neurological:      Mental Status: He is alert and oriented to person, place, and time.   Psychiatric:         Thought Content: Thought content normal.         Judgment: Judgment normal.            Assessment & Plan      indications: previous adenomatous polyp         I recommend colonoscopy.  I described risks, benefits of the procedure with the patient including but not limited to bleeding, infection, possibility of perforation and possible polypectomy. All of the patient's questions were answered and they would like to proceed with the above recommendations.

## 2023-12-04 NOTE — ANESTHESIA POSTPROCEDURE EVALUATION
Patient: Ethan Almonte    Procedure Summary       Date: 12/04/23 Room / Location:  [facility]    Anesthesia Start: 0757 Anesthesia Stop: 0826    Procedure: COLONOSCOPY to cecum with cold biopsy polypectomies Diagnosis:       History of colon polyps      (History of colon polyps [Z86.010])    Surgeons: Spencer Phelan MD Provider: Al Cervantes MD    Anesthesia Type: MAC ASA Status: 3            Anesthesia Type: MAC    Vitals  Vitals Value Taken Time   /68 12/04/23 0858   Temp     Pulse 61 12/04/23 0901   Resp 12 12/04/23 0844   SpO2 97 % 12/04/23 0901   Vitals shown include unfiled device data.        Post Anesthesia Care and Evaluation    Patient location during evaluation: PACU  Patient participation: complete - patient participated  Level of consciousness: awake and alert  Pain management: adequate    Airway patency: patent  Anesthetic complications: No anesthetic complications    Cardiovascular status: acceptable  Respiratory status: acceptable  Hydration status: acceptable    Comments: --------------------            12/04/23 0844     --------------------   BP:       116/68     Pulse:      80       Resp:       12       SpO2:      97%      --------------------

## 2023-12-05 LAB
LAB AP CASE REPORT: NORMAL
PATH REPORT.FINAL DX SPEC: NORMAL
PATH REPORT.GROSS SPEC: NORMAL

## (undated) DEVICE — CANN NASL CO2 TRULINK W/O2 A/

## (undated) DEVICE — SINGLE-USE BIOPSY FORCEPS: Brand: RADIAL JAW 4

## (undated) DEVICE — SENSR O2 OXIMAX FNGR A/ 18IN NONSTR

## (undated) DEVICE — ADAPT CLN BIOGUARD AIR/H2O DISP

## (undated) DEVICE — TUBING, SUCTION, 1/4" X 10', STRAIGHT: Brand: MEDLINE

## (undated) DEVICE — LN SMPL CO2 SHTRM SD STREAM W/M LUER

## (undated) DEVICE — KT ORCA ORCAPOD DISP STRL

## (undated) DEVICE — CANN O2 ETCO2 FITS ALL CONN CO2 SMPL A/ 7IN DISP LF

## (undated) DEVICE — THE TORRENT IRRIGATION SCOPE CONNECTOR IS USED WITH THE TORRENT IRRIGATION TUBING TO PROVIDE IRRIGATION FLUIDS SUCH AS STERILE WATER DURING GASTROINTESTINAL ENDOSCOPIC PROCEDURES WHEN USED IN CONJUNCTION WITH AN IRRIGATION PUMP (OR ELECTROSURGICAL UNIT).: Brand: TORRENT

## (undated) DEVICE — Device: Brand: DEFENDO AIR/WATER/SUCTION AND BIOPSY VALVE